# Patient Record
Sex: FEMALE | Race: WHITE | NOT HISPANIC OR LATINO | Employment: FULL TIME | URBAN - METROPOLITAN AREA
[De-identification: names, ages, dates, MRNs, and addresses within clinical notes are randomized per-mention and may not be internally consistent; named-entity substitution may affect disease eponyms.]

---

## 2017-03-21 ENCOUNTER — OFFICE VISIT (OUTPATIENT)
Dept: ENDOCRINOLOGY | Age: 27
End: 2017-03-21

## 2017-03-21 VITALS
DIASTOLIC BLOOD PRESSURE: 70 MMHG | OXYGEN SATURATION: 96 % | HEART RATE: 117 BPM | BODY MASS INDEX: 25.58 KG/M2 | HEIGHT: 62 IN | SYSTOLIC BLOOD PRESSURE: 140 MMHG | WEIGHT: 139 LBS

## 2017-03-21 DIAGNOSIS — E55.9 VITAMIN D DEFICIENCY: ICD-10-CM

## 2017-03-21 DIAGNOSIS — IMO0002 DIABETES MELLITUS TYPE 1, UNCONTROLLED, WITH COMPLICATIONS: Primary | ICD-10-CM

## 2017-03-21 DIAGNOSIS — E55.9 VITAMIN D DEFICIENCY: Primary | ICD-10-CM

## 2017-03-21 PROCEDURE — 99214 OFFICE O/P EST MOD 30 MIN: CPT | Performed by: INTERNAL MEDICINE

## 2017-03-21 NOTE — PROGRESS NOTES
26 y.o.    Patient Care Team:  Patricia Milner MD as PCP - General    Chief Complaint:  Type 1 dm    Subjective     HPI Kati Garza,26 y.o. WF is here as a follow up of Type 1 dm. Used to see Dr. Roper. Last saw him in .     Type 1 dm - Diagnosed when pt was 16 months old. Insulin was started right away. Currently on animas insulin pump. Pt used to have sensor but is not using now as the battery . Pt used sensor last in Sep 2016.  Checks BG once a week. Doesn't have FBG. Random  - 350 mg/dl. Didn't get her log book for me to review.   No increased urination or increased thirst. No BG less than 60 mg/dl in the last one month, does have hypoglycemic awareness. Highest BG in the last 1 month was -  450 mg/dl.  No nausea and vomiting.   Up to date with eye exam, Sep 2016 and no dm retinopathy, no laser therapy.   No tingling or numbness in her b/l feet, no ulcers and amputations of her feet.   No CAD, CKD,CVA per pt.   Pt is physically active. Weight has been stable.   Pt tries to follow DM diet for most part.  Last DKA episode was more than 5 years.     [place] pump -   Basal settings - 22.9   12 am - 0.9  3 am - 0.6  7 am - 0.9  8:30 pm - 1.1  9 pm - 0.9     Bolus - 12 am - 1 unit - 15 gm  4 am - 1 unit - 8 gm of carbs    SF - 1 - 40   BG target - 100 +/- 20    No recent Hba1c    Interval History      The following portions of the patient's history were reviewed and updated as appropriate: allergies, current medications, past family history, past medical history, past social history, past surgical history and problem list.    No past medical history on file.  No family history on file.  Social History     Social History   • Marital status: Single     Spouse name: N/A   • Number of children: N/A   • Years of education: N/A     Occupational History   • Not on file.     Social History Main Topics   • Smoking status: Not on file   • Smokeless tobacco: Not on file   • Alcohol use Not on file   •  "Drug use: Not on file   • Sexual activity: Not on file     Other Topics Concern   • Not on file     Social History Narrative     Allergies   Allergen Reactions   • No Known Drug Allergy        Current Outpatient Prescriptions:   •  insulin lispro (HUMALOG) 100 UNIT/ML injection, Inject  under the skin. Total of 50 units in a day, Disp: , Rfl:         Review of Systems   Constitutional: Negative for appetite change, chills, diaphoresis and fatigue.   HENT: Negative for hearing loss, nosebleeds, postnasal drip, trouble swallowing and voice change.    Eyes: Negative for photophobia, discharge and visual disturbance.   Respiratory: Negative for cough, shortness of breath and wheezing.    Cardiovascular: Negative for chest pain, palpitations and leg swelling.   Gastrointestinal: Negative for abdominal distention, abdominal pain, constipation, diarrhea, nausea and vomiting.   Endocrine: Negative for cold intolerance, heat intolerance, polydipsia and polyuria.   Genitourinary: Negative for dysuria, frequency and hematuria.   Musculoskeletal: Negative for arthralgias, back pain and myalgias.   Skin: Negative for pallor, rash and wound.   Neurological: Negative for dizziness, tremors, seizures, syncope, weakness, numbness and headaches.   Hematological: Negative for adenopathy.   Psychiatric/Behavioral: Negative for agitation, confusion and sleep disturbance. The patient is not nervous/anxious.        Objective       Vitals:    03/21/17 0856   BP: 140/70   Pulse: 117   SpO2: 96%   Weight: 139 lb (63 kg)   Height: 62\" (157.5 cm)     Body mass index is 25.42 kg/(m^2).      Physical Exam   Constitutional: She is oriented to person, place, and time. She appears well-developed and well-nourished. No distress.   HENT:   Head: Normocephalic and atraumatic.   Mouth/Throat: Oropharynx is clear and moist.   Eyes: Conjunctivae and EOM are normal. Pupils are equal, round, and reactive to light. No scleral icterus.   Neck: Normal range " of motion. Neck supple. No tracheal deviation present. No thyromegaly present.   Cardiovascular: Normal rate, regular rhythm and normal heart sounds.  Exam reveals no friction rub.    No murmur heard.  Pulmonary/Chest: Effort normal and breath sounds normal. No stridor. She has no wheezes. She has no rales.   Abdominal: Soft. Bowel sounds are normal. She exhibits no distension. There is no tenderness.   Musculoskeletal: Normal range of motion. She exhibits no edema or deformity.    Kati had a diabetic foot exam performed today.   During the foot exam she had a monofilament test performed.    Neurological Sensory Findings -  Unaltered sharp/dull right ankle/foot discrimination and unaltered sharp/dull left ankle/foot discrimination. No right ankle/foot altered proprioception and no left ankle/foot altered proprioception    Vascular Status -  Her exam exhibits no right foot edema. Her exam exhibits no left foot edema.   Skin Integrity  -  Her right foot skin is not intact.   Kati's left foot skin is not intact. .   Foot Structure and Biomechanics -  Her right foot has no hammer toes present. Her left foot has no hammer toes.      Lymphadenopathy:     She has no cervical adenopathy.   Neurological: She is alert and oriented to person, place, and time. She has normal reflexes. She displays normal reflexes.   Skin: Skin is warm and dry. She is not diaphoretic.   Psychiatric: She has a normal mood and affect. Judgment normal.   Vitals reviewed.    Results Review:     I reviewed the patient's new clinical results.    Medical records reviewed  Summary:Done      Conversion Encounter on 04/14/2015   Component Date Value Ref Range Status   • TSH 04/14/2015 1.78  0.27 - 4.20 uIU/mL Final   • Free T4 04/14/2015 1.18  0.93 - 1.70 ng/dL Final   • Glucose 04/14/2015 300* 65 - 99 mg/dL Final   • BUN 04/14/2015 9  6 - 20 mg/dL Final   • Creatinine 04/14/2015 0.73  0.57 - 1.00 mg/dL Final   • eGFR Non  Am 04/14/2015 >60   mL/min/1.732 Final    Comment: GFR Normal                            >60  Chronic Kidney Disease          <60  Kidney Failure                         <15     • eGFR  Am 04/14/2015 >60  mL/min/1.732 Final    Comment: GFR Normal                            >60  Chronic Kidney Disease          <60  Kidney Failure                         <15     • BUN/Creatinine Ratio 04/14/2015 12   Final   • Sodium 04/14/2015 135* 136 - 145 mmol/L Final   • Potassium 04/14/2015 3.8  3.5 - 5.2 mmol/L Final   • Chloride 04/14/2015 97* 98 - 107 mmol/L Final   • Total CO2 04/14/2015 22  22 - 29 mmol/L Final   • Calcium 04/14/2015 9.8  8.6 - 10.5 mg/dL Final   • Creatinine, Urine 04/14/2015 39.9  16.0 - 327.0 mg/dL Final   • Microalbumin, Urine 04/14/2015 3.0  0.0 - 17.0 ug/mL Final   • Microalbumin/Creatinine Ratio Urine 04/14/2015 7.5  0.0 - 30.0 mg/g creat Final     No results found for: HGBA1C  Lab Results   Component Value Date    MICROALBUR 3.0 04/14/2015    CREATININE 0.73 04/14/2015     Imaging Results (most recent)     None                Assessment and Plan:    Diagnoses and all orders for this visit:    Diabetes mellitus type 1, uncontrolled, with complications  -     Hemoglobin A1c  -     Comprehensive Metabolic Panel  -     Lipid Panel  -     Microalbumin / Creatinine Urine Ratio  -     TSH  -     Vitamin B12 & Folate  -     Vitamin D 25 Hydroxy    Type 1 diabetes mellitus currently on animas insulin pump  Counseled patient that she needs to check her blood sugars at least 3-4 times per day  Counseled patient that she needs to start wearing her sensor.  Will do the required paperwork for her to get started back on the sensor.  Will not make changes to the insulin pump at this time as I don't have any data to go by.    Will check lipid panel, urine microalbuminuria  Will check TSH, vitamin B12 and vitamin D 25-hydroxy, TSH.     The total time spent more than 25 min for old record and lab review and face- to- face, of  which greater than 50% of time was spent in counseling the patient on treatment options, instructions for management/treatment and follow up and importance of compliance with chosen management or treatment options

## 2017-03-22 LAB
25(OH)D3+25(OH)D2 SERPL-MCNC: 15.7 NG/ML (ref 30–100)
ALBUMIN SERPL-MCNC: 4.5 G/DL (ref 3.5–5.2)
ALBUMIN/CREAT UR: 13.9 MG/G CREAT (ref 0–30)
ALBUMIN/GLOB SERPL: 1.7 G/DL
ALP SERPL-CCNC: 65 U/L (ref 39–117)
ALT SERPL-CCNC: 10 U/L (ref 1–33)
AST SERPL-CCNC: 8 U/L (ref 1–32)
BILIRUB SERPL-MCNC: 0.2 MG/DL (ref 0.1–1.2)
BUN SERPL-MCNC: 9 MG/DL (ref 6–20)
BUN/CREAT SERPL: 11.4 (ref 7–25)
CALCIUM SERPL-MCNC: 10.2 MG/DL (ref 8.6–10.5)
CHLORIDE SERPL-SCNC: 103 MMOL/L (ref 98–107)
CHOLEST SERPL-MCNC: 181 MG/DL (ref 0–200)
CO2 SERPL-SCNC: 26 MMOL/L (ref 22–29)
CREAT SERPL-MCNC: 0.79 MG/DL (ref 0.57–1)
CREAT UR-MCNC: 256.1 MG/DL
FOLATE SERPL-MCNC: 12.93 NG/ML (ref 4.78–24.2)
GLOBULIN SER CALC-MCNC: 2.7 GM/DL
GLUCOSE SERPL-MCNC: 125 MG/DL (ref 65–99)
HBA1C MFR BLD: 10.7 % (ref 4.8–5.6)
HDLC SERPL-MCNC: 53 MG/DL (ref 40–60)
LDLC SERPL CALC-MCNC: 116 MG/DL (ref 0–100)
MICROALBUMIN UR-MCNC: 35.5 UG/ML
POTASSIUM SERPL-SCNC: 4 MMOL/L (ref 3.5–5.2)
PROT SERPL-MCNC: 7.2 G/DL (ref 6–8.5)
SODIUM SERPL-SCNC: 142 MMOL/L (ref 136–145)
TRIGL SERPL-MCNC: 59 MG/DL (ref 0–150)
TSH SERPL DL<=0.005 MIU/L-ACNC: 1.75 MIU/ML (ref 0.27–4.2)
VIT B12 SERPL-MCNC: 632 PG/ML (ref 211–946)
VLDLC SERPL CALC-MCNC: 11.8 MG/DL (ref 5–40)

## 2017-03-22 RX ORDER — ERGOCALCIFEROL 1.25 MG/1
50000 CAPSULE ORAL 2 TIMES WEEKLY
Qty: 30 CAPSULE | Refills: 11 | Status: SHIPPED | OUTPATIENT
Start: 2017-03-23 | End: 2019-02-21 | Stop reason: SDUPTHER

## 2017-03-22 NOTE — PROGRESS NOTES
Mail results to pt, no treatment changes at this time. Hba1c as expected is high, will adjust pump settings during next visit.   Vitamin D is low, calling in Vitamin D replacement.

## 2017-05-12 PROBLEM — IMO0001 IDDM (INSULIN DEPENDENT DIABETES MELLITUS): Status: ACTIVE | Noted: 2017-05-12

## 2017-05-12 PROBLEM — IMO0002 DIABETES MELLITUS TYPE 1, UNCONTROLLED: Status: ACTIVE | Noted: 2017-05-12

## 2017-05-12 RX ORDER — LANCETS
EACH MISCELLANEOUS
COMMUNITY
Start: 2013-03-04 | End: 2022-09-09 | Stop reason: SDUPTHER

## 2017-05-12 RX ORDER — INSULIN GLARGINE 100 [IU]/ML
INJECTION, SOLUTION SUBCUTANEOUS
COMMUNITY
Start: 2015-06-08 | End: 2019-02-19

## 2019-02-19 ENCOUNTER — OFFICE VISIT (OUTPATIENT)
Dept: ENDOCRINOLOGY | Age: 29
End: 2019-02-19

## 2019-02-19 VITALS
WEIGHT: 146.6 LBS | BODY MASS INDEX: 26.98 KG/M2 | SYSTOLIC BLOOD PRESSURE: 96 MMHG | DIASTOLIC BLOOD PRESSURE: 60 MMHG | HEIGHT: 62 IN | HEART RATE: 112 BPM

## 2019-02-19 DIAGNOSIS — E10.65 UNCONTROLLED TYPE 1 DIABETES MELLITUS WITH HYPERGLYCEMIA (HCC): Primary | ICD-10-CM

## 2019-02-19 PROCEDURE — 99214 OFFICE O/P EST MOD 30 MIN: CPT | Performed by: INTERNAL MEDICINE

## 2019-02-19 NOTE — PROGRESS NOTES
28 y.o.    Patient Care Team:  Patricia Milner MD as PCP - General    Chief Complaint:      F/U TYPE 1 DIABETES, UNCONTROLLED.  NO RECENT LABS.  Subjective     HPI    Kati Garza,28 y.o. WF is here as a follow up of Type 1 dm.   Last seen by me in March 2017.  Patient has been extremely noncompliant in terms of keeping up with her appointments and following up.    Type 1 diabetes mellitus-diagnosed when she was 16 months old.  Currently she is on animus insulin pump.  She has not been using the sensor since September 2016.  Has been checking her blood sugars 2-3 times a day.  Most of her blood sugars are running between 100-300s range.  No complaints of increased urination or thirst.  No drastic low blood sugar episodes, lowest blood sugar was 94.  Does have hypoglycemic awareness.  Highest blood sugar was 450, this was when she had Whitney for [employer].  No nausea or vomiting.  Last eye examination was in December 2017, no known history of diabetic retinopathy according to the patient.  No known history of diabetic neuropathy per patient.  No ulcers or open wounds on her body.  No CAD, CKD,CVA per pt.   Pt is physically active. Weight has been stable.   Pt tries to follow DM diet for most part.  Last DKA episode was more than 5 years.      [place] pump -   Basal settings - 22.9   12 am - 0.9  3 am - 0.6  7 am - 0.9  8:30 pm - 1.1  9 pm - 0.9      Bolus - 12 am - 1 unit - 15 gm  4 am - 1 unit - 8 gm of carbs     SF -12 AM- 1 - 40     4M- 1 for 50   BG target - 100 +/- 20     No recent Hba1c      Reviewed primary care physician's/consulting physician documentation and lab results :     Interval History      The following portions of the patient's history were reviewed and updated as appropriate: allergies, current medications, past family history, past medical history, past social history, past surgical history and problem list.    History reviewed. No pertinent past medical history.  History reviewed.  "No pertinent family history.  Social History     Socioeconomic History   • Marital status: Single     Spouse name: Not on file   • Number of children: Not on file   • Years of education: Not on file   • Highest education level: Not on file   Social Needs   • Financial resource strain: Not on file   • Food insecurity - worry: Not on file   • Food insecurity - inability: Not on file   • Transportation needs - medical: Not on file   • Transportation needs - non-medical: Not on file   Occupational History   • Not on file   Tobacco Use   • Smoking status: Not on file   Substance and Sexual Activity   • Alcohol use: Not on file   • Drug use: Not on file   • Sexual activity: Not on file   Other Topics Concern   • Not on file   Social History Narrative   • Not on file     Allergies   Allergen Reactions   • No Known Drug Allergy        Current Outpatient Medications:   •  glucose blood (ONE TOUCH ULTRA TEST) test strip, OneTouch Ultra Blue STRP; Patient Sig: OneTouch Ultra Blue STRP TEST 4  TIMES A DAY; 2; 0; 04-Mar-2013; Active, Disp: , Rfl:   •  insulin lispro (HUMALOG) 100 UNIT/ML injection, Inject  under the skin. Total of 50 units in a day, Disp: , Rfl:   •  Lancets (ONETOUCH ULTRASOFT) lancets, , Disp: , Rfl:   •  vitamin D (ERGOCALCIFEROL) 85984 UNITS capsule capsule, Take 1 capsule by mouth 2 (Two) Times a Week., Disp: 30 capsule, Rfl: 11        Review of Systems   Constitutional: Negative for chills, fatigue and fever.   Cardiovascular: Negative for chest pain and palpitations.   Gastrointestinal: Negative for abdominal pain, constipation, diarrhea, nausea and vomiting.   Endocrine: Negative for cold intolerance and heat intolerance.       Objective       Vitals:    02/19/19 1414   BP: 96/60   Pulse: 112   Weight: 66.5 kg (146 lb 9.6 oz)   Height: 157.5 cm (62\")     Body mass index is 26.81 kg/m².      Physical Exam   Constitutional: She is oriented to person, place, and time. She appears well-nourished.   HENT: "   Head: Normocephalic and atraumatic.   Eyes: Conjunctivae and EOM are normal. No scleral icterus.   Neck: Normal range of motion. Neck supple. No thyromegaly present.   Cardiovascular: Normal rate and normal heart sounds. Exam reveals no friction rub.   No murmur heard.  Pulmonary/Chest: Effort normal and breath sounds normal. No stridor. She has no wheezes. She has no rales.   Abdominal: Soft. Bowel sounds are normal. She exhibits no distension. There is no tenderness.   Pump site noted   Musculoskeletal: She exhibits no edema or tenderness.   Lymphadenopathy:     She has no cervical adenopathy.   Neurological: She is alert and oriented to person, place, and time.   Skin: Skin is warm and dry. She is not diaphoretic.   Psychiatric: She has a normal mood and affect.   Vitals reviewed.    Results Review:     I reviewed the patient's new clinical results and mentioned them above in HPI and in plan as well.    Medical records reviewed  Summary:done      Office Visit on 03/21/2017   Component Date Value Ref Range Status   • Hemoglobin A1C 03/21/2017 10.70* 4.80 - 5.60 % Final    Comment: Hemoglobin A1C Ranges:  Increased Risk for Diabetes  5.7% to 6.4%  Diabetes                     >= 6.5%  Diabetic Goal                < 7.0%     • Glucose 03/21/2017 125* 65 - 99 mg/dL Final   • BUN 03/21/2017 9  6 - 20 mg/dL Final   • Creatinine 03/21/2017 0.79  0.57 - 1.00 mg/dL Final   • eGFR Non  Am 03/21/2017 87  >60 mL/min/1.73 Final   • eGFR African Am 03/21/2017 105  >60 mL/min/1.73 Final   • BUN/Creatinine Ratio 03/21/2017 11.4  7.0 - 25.0 Final   • Sodium 03/21/2017 142  136 - 145 mmol/L Final   • Potassium 03/21/2017 4.0  3.5 - 5.2 mmol/L Final   • Chloride 03/21/2017 103  98 - 107 mmol/L Final   • Total CO2 03/21/2017 26.0  22.0 - 29.0 mmol/L Final   • Calcium 03/21/2017 10.2  8.6 - 10.5 mg/dL Final   • Total Protein 03/21/2017 7.2  6.0 - 8.5 g/dL Final   • Albumin 03/21/2017 4.50  3.50 - 5.20 g/dL Final   • Globulin  03/21/2017 2.7  gm/dL Final   • A/G Ratio 03/21/2017 1.7  g/dL Final   • Total Bilirubin 03/21/2017 0.2  0.1 - 1.2 mg/dL Final   • Alkaline Phosphatase 03/21/2017 65  39 - 117 U/L Final   • AST (SGOT) 03/21/2017 8  1 - 32 U/L Final   • ALT (SGPT) 03/21/2017 10  1 - 33 U/L Final   • Total Cholesterol 03/21/2017 181  0 - 200 mg/dL Final   • Triglycerides 03/21/2017 59  0 - 150 mg/dL Final   • HDL Cholesterol 03/21/2017 53  40 - 60 mg/dL Final   • VLDL Cholesterol 03/21/2017 11.8  5 - 40 mg/dL Final   • LDL Cholesterol  03/21/2017 116* 0 - 100 mg/dL Final   • Creatinine, Urine 03/21/2017 256.1  Not Estab. mg/dL Final   • Microalbumin, Urine 03/21/2017 35.5  Not Estab. ug/mL Final   • Microalbumin/Creatinine Ratio Urine 03/21/2017 13.9  0.0 - 30.0 mg/g creat Final   • TSH 03/21/2017 1.750  0.270 - 4.200 mIU/mL Final   • Vitamin B-12 03/21/2017 632  211 - 946 pg/mL Final   • Folate 03/21/2017 12.93  4.78 - 24.20 ng/mL Final    Comment: Please Note: The Folate Reference Range has changed based  on the WHO Standard. Previous Reference Range was  (7.3-26.1)     • 25 Hydroxy, Vitamin D 03/21/2017 15.7* 30.0 - 100.0 ng/mL Final    Comment: Vitamin D deficiency has been defined by the [place] of  Medicine and an Endocrine Society practice guideline as a  level of serum 25-OH vitamin D less than 20 ng/mL (1,2).  The Endocrine Society went on to further define vitamin D  insufficiency as a level between 21 and 29 ng/mL (2).  1. IOM ([place] of [employer]). 2010. Dietary reference     intakes for calcium and D. [place]: The     National Academies Press.  2. Anju MF, Arslan NC, Samantha CAPONE, et al.     Evaluation, treatment, and prevention of vitamin D     deficiency: an Endocrine Society clinical practice     guideline. JCEM. 2011 Jul; 96(7):1911-30.       Lab Results   Component Value Date    HGBA1C 10.70 (H) 03/21/2017     Lab Results   Component Value Date    MICROALBUR 35.5 03/21/2017    CREATININE 0.79  "03/21/2017     Imaging Results (most recent)     None                Assessment and Plan:    Diagnoses and all orders for this visit:    Uncontrolled type 1 diabetes mellitus with hyperglycemia (CMS/[facility])  -     Basic Metabolic Panel  -     Hemoglobin A1c  -     TSH  -     Vitamin B12 & Folate  -     Vitamin D 25 Hydroxy  -     Microalbumin / Creatinine Urine Ratio - Urine, Clean Catch  -     Hemoglobin A1c; Future  -     Lipid Panel; Future      Type 1 diabetes mellitus-uncontrolled, complicated with noncompliance  Will get the patient attached to [employer] agent to discuss more about the benefits of 670 G.  We will place continuous glucose monitoring on the patient to further assess her blood glucose trends and make adjustments to her insulin pump.  Explained to the patient clearly that she needs to show compliance before she goes on the Medtronic insulin pump if not the paperwork cannot be refilled or renewed.    We will check HbA1c and based on the CGM information will do the pump titrations.    We will check thyroid panel, vitamin D levels.    Will check lipid panel during her next visit.    Reviewed Lab results with the patient.     Placed CGM on the pt for BG monitoring.   Explained the benefits of CGM to the pt.   CGM would help in adjusting pt's insulin regimen and prevent hyperglycemia and hypoglycemia episodes.   Pt would wear CGM for the next 2 weeks and based on the data downloaded we will adjust his DM medications.         David Zaragoza MD  02/19/19    EMR Dragon / transcription disclaimer:     \"Dictated utilizing Dragon dictation\".  "

## 2019-02-20 LAB
25(OH)D3+25(OH)D2 SERPL-MCNC: 12.3 NG/ML (ref 30–100)
ALBUMIN/CREAT UR: 25.9 MG/G CREAT (ref 0–30)
BUN SERPL-MCNC: 7 MG/DL (ref 6–20)
BUN/CREAT SERPL: 11 (ref 9–23)
CALCIUM SERPL-MCNC: 9.1 MG/DL (ref 8.7–10.2)
CHLORIDE SERPL-SCNC: 105 MMOL/L (ref 96–106)
CO2 SERPL-SCNC: 21 MMOL/L (ref 20–29)
CREAT SERPL-MCNC: 0.66 MG/DL (ref 0.57–1)
CREAT UR-MCNC: 41.7 MG/DL
FOLATE SERPL-MCNC: 7.3 NG/ML
GLUCOSE SERPL-MCNC: 89 MG/DL (ref 65–99)
HBA1C MFR BLD: 11.7 % (ref 4.8–5.6)
MICROALBUMIN UR-MCNC: 10.8 UG/ML
POTASSIUM SERPL-SCNC: 4 MMOL/L (ref 3.5–5.2)
SODIUM SERPL-SCNC: 140 MMOL/L (ref 134–144)
TSH SERPL DL<=0.005 MIU/L-ACNC: 1.61 UIU/ML (ref 0.45–4.5)
VIT B12 SERPL-MCNC: 584 PG/ML (ref 232–1245)

## 2019-02-21 RX ORDER — ERGOCALCIFEROL 1.25 MG/1
50000 CAPSULE ORAL 2 TIMES WEEKLY
Qty: 30 CAPSULE | Refills: 11 | Status: SHIPPED | OUTPATIENT
Start: 2019-02-21 | End: 2020-05-19

## 2019-02-21 NOTE — PROGRESS NOTES
Mail results to pt, no treatment changes at this time.  Pending continuous glucose monitoring information to make further changes to the insulin pump.  HbA1c is worse than last visit.  Vitamin D levels are very low, will start patient on vitamin D prescription.

## 2019-03-20 ENCOUNTER — TREATMENT (OUTPATIENT)
Dept: ENDOCRINOLOGY | Age: 29
End: 2019-03-20

## 2019-03-20 DIAGNOSIS — E10.65 UNCONTROLLED TYPE 1 DIABETES MELLITUS WITH HYPERGLYCEMIA (HCC): ICD-10-CM

## 2019-03-20 PROCEDURE — 95251 CONT GLUC MNTR ANALYSIS I&R: CPT | Performed by: INTERNAL MEDICINE

## 2019-03-21 NOTE — PROGRESS NOTES
Continues glucose monitoring data     Continuous glucose monitoring was placed on  Feb 19th      Patient wore continuous glucose monitoring-free style sarai Pro from feb 19- March 5th   Average blood glucose reading was 236   Estimated HbA1c 9-10   Likelihood of low blood glucose levels was low   Likelihood of high blood glucose levels was high   Blood glucose trends showed consistent high bg.      Current treatment regimen     Pump settings   12 am - 0.9   3 am - 0.6  7 am - 0.9  8:30 pm 1.1   9  pm 0.9     Bolus   12 am - 1 unit for 15 Gm   4 am - 1 unit for 8 Gm      Sensitivity 1 for 40      Changes to the treatment regimen     12 am - 1.0   3 am - 1.25  7 am - 1.5  8:30 pm 1.0  9  pm 0.9     Bolus   12 am - 1 unit for 10 gm   4 am - 1 unit for 6 gm     Please call pt and make these changes.   If pt not comfortable for these changes make an appointment with me in 4-6 weeks based on availability if not plz place on waiting list.

## 2019-04-23 ENCOUNTER — LAB (OUTPATIENT)
Dept: ENDOCRINOLOGY | Age: 29
End: 2019-04-23

## 2019-04-23 DIAGNOSIS — E10.65 UNCONTROLLED TYPE 1 DIABETES MELLITUS WITH HYPERGLYCEMIA (HCC): ICD-10-CM

## 2019-04-23 LAB
CHOLEST SERPL-MCNC: 165 MG/DL (ref 0–200)
HBA1C MFR BLD: 10.6 % (ref 4.8–5.6)
HDLC SERPL-MCNC: 61 MG/DL (ref 40–60)
INTERPRETATION: NORMAL
LDLC SERPL CALC-MCNC: 95 MG/DL (ref 0–100)
Lab: NORMAL
TRIGL SERPL-MCNC: 43 MG/DL (ref 0–150)
VLDLC SERPL CALC-MCNC: 8.6 MG/DL

## 2019-05-07 ENCOUNTER — OFFICE VISIT (OUTPATIENT)
Dept: ENDOCRINOLOGY | Age: 29
End: 2019-05-07

## 2019-05-07 VITALS
WEIGHT: 145 LBS | OXYGEN SATURATION: 99 % | HEART RATE: 103 BPM | DIASTOLIC BLOOD PRESSURE: 80 MMHG | BODY MASS INDEX: 26.68 KG/M2 | HEIGHT: 62 IN | SYSTOLIC BLOOD PRESSURE: 130 MMHG

## 2019-05-07 DIAGNOSIS — Z46.81 INSULIN PUMP TITRATION: ICD-10-CM

## 2019-05-07 DIAGNOSIS — E10.65 UNCONTROLLED TYPE 1 DIABETES MELLITUS WITH HYPERGLYCEMIA (HCC): Primary | ICD-10-CM

## 2019-05-07 PROCEDURE — 99214 OFFICE O/P EST MOD 30 MIN: CPT | Performed by: INTERNAL MEDICINE

## 2019-05-07 NOTE — PROGRESS NOTES
29 y.o.    Patient Care Team:  Patricia Milner MD as PCP - General    Chief Complaint:    Follow up/ type 2 diabetes mellitus  Subjective     HPI    Kati Garza,29 y.o. WF is here as a follow up of Type 1 dm.   Since patient's last visit patient has exchanged her animus insulin pump to Medtronic insulin pump.         Type 1 diabetes mellitus-diagnosed when she was 16 months old.    She is currently on the Medtronic insulin pump-670 G, she still has the sensor being processed.  She is currently using the manual mode and the  bolus settings.  Still checking her blood sugars 1-2 times a day.  Fasting blood [id number]-, pre-meal blood sugars-unknown as patient is not checking at that time.  She does think that she is not having significant loose less than 60, lowest was 84.  Does have hypoglycemic awareness.  Highest blood sugar would have been approximately 300 but she is not sure of it as she is not checking her blood sugars that often.  No increased urination or thirst  No nausea or vomiting.  Last eye examination was in December 2017, no known history of diabetic retinopathy according to the patient.  No known history of diabetic neuropathy per patient.  No ulcers or open wounds on her body.  No CAD, CKD,CVA per pt.   Pt is physically active. Weight has been stable.   Pt tries to follow DM diet for most part.  She reports being comfortable and carbohydrate count  Last DKA episode was more than 5 years.     Reviewed primary care physician's/consulting physician documentation and lab results :     Interval History      The following portions of the patient's history were reviewed and updated as appropriate: allergies, current medications, past family history, past medical history, past social history, past surgical history and problem list.    History reviewed. No pertinent past medical history.  History reviewed. No pertinent family history.  Social History     Socioeconomic History   • Marital status:  "Single     Spouse name: Not on file   • Number of children: Not on file   • Years of education: Not on file   • Highest education level: Not on file     Allergies   Allergen Reactions   • No Known Drug Allergy        Current Outpatient Medications:   •  glucose blood (ONE TOUCH ULTRA TEST) test strip, OneTouch Ultra Blue STRP; Patient Sig: OneTouch Ultra Blue STRP TEST 4  TIMES A DAY; 2; 0; 04-Mar-2013; Active, Disp: , Rfl:   •  insulin lispro (HUMALOG) 100 UNIT/ML injection, Total of 50 units in a day, Disp: 50 mL, Rfl: 3  •  Lancets (ONETOUCH ULTRASOFT) lancets, , Disp: , Rfl:   •  vitamin D (ERGOCALCIFEROL) 16192 units capsule capsule, Take 1 capsule by mouth 2 (Two) Times a Week., Disp: 30 capsule, Rfl: 11        Review of Systems   Constitutional: Negative for appetite change, fatigue and fever.   Eyes: Negative for visual disturbance.   Respiratory: Negative for shortness of breath.    Cardiovascular: Negative for palpitations and leg swelling.   Gastrointestinal: Negative for abdominal pain and vomiting.   Endocrine: Negative for polydipsia and polyuria.   Musculoskeletal: Negative for joint swelling and neck pain.   Skin: Negative for rash.   Neurological: Negative for weakness and numbness.   Psychiatric/Behavioral: Negative for behavioral problems.       Objective       Vitals:    05/07/19 1149   BP: 130/80   Pulse: 103   SpO2: 99%   Weight: 65.8 kg (145 lb)   Height: 157.5 cm (62\")     Body mass index is 26.52 kg/m².      Physical Exam   Constitutional: She is oriented to person, place, and time. She appears well-nourished.   HENT:   Head: Normocephalic and atraumatic.   Eyes: Conjunctivae and EOM are normal. No scleral icterus.   Neck: Normal range of motion. Neck supple. No thyromegaly present.   Cardiovascular: Normal rate and normal heart sounds. Exam reveals no friction rub.   No murmur heard.  Pulmonary/Chest: Effort normal and breath sounds normal. No stridor. She has no wheezes. She has no rales. "   Abdominal: Soft. Bowel sounds are normal. She exhibits no distension. There is no tenderness.   Pump site intact   Musculoskeletal: She exhibits no edema or tenderness.   Lymphadenopathy:     She has no cervical adenopathy.   Neurological: She is alert and oriented to person, place, and time.   Skin: Skin is warm and dry. She is not diaphoretic.   Psychiatric: She has a normal mood and affect.   Vitals reviewed.    Results Review:     I reviewed the patient's new clinical results and mentioned them above in HPI and in plan as well.    Medical records reviewed  Summary: done      Lab on 04/23/2019   Component Date Value Ref Range Status   • Total Cholesterol 04/23/2019 165  0 - 200 mg/dL Final   • Triglycerides 04/23/2019 43  0 - 150 mg/dL Final   • HDL Cholesterol 04/23/2019 61* 40 - 60 mg/dL Final   • VLDL Cholesterol 04/23/2019 8.6  mg/dL Final   • LDL Cholesterol  04/23/2019 95  0 - 100 mg/dL Final   • Hemoglobin A1C 04/23/2019 10.60* 4.80 - 5.60 % Final    Comment: Hemoglobin A1C Ranges:  Increased Risk for Diabetes  5.7% to 6.4%  Diabetes                     >= 6.5%  Diabetic Goal                < 7.0%     • Interpretation 04/23/2019 Note   Final    Supplemental report is available.   • PDF Image 04/23/2019 Not applicable   Final     Lab Results   Component Value Date    HGBA1C 10.60 (H) 04/23/2019    HGBA1C 11.7 (H) 02/19/2019    HGBA1C 10.70 (H) 03/21/2017     Lab Results   Component Value Date    MICROALBUR 10.8 02/19/2019    CREATININE 0.66 02/19/2019     Imaging Results (most recent)     None                Assessment and Plan:    Diagnoses and all orders for this visit:    Uncontrolled type 1 diabetes mellitus with hyperglycemia (CMS/[facility])  -     Hemoglobin A1c; Future  -     Basic Metabolic Panel; Future  -     Lipid Panel; Future  -     Microalbumin / Creatinine Urine Ratio - Urine, Clean Catch; Future  -     TSH; Future  -     Vitamin B12 & Folate; Future  -     Vitamin D 25 Hydroxy; Future  -     T4,  "Free; Future    Insulin pump titration  -     Hemoglobin A1c; Future  -     Basic Metabolic Panel; Future  -     Lipid Panel; Future  -     Microalbumin / Creatinine Urine Ratio - Urine, Clean Catch; Future  -     TSH; Future  -     Vitamin B12 & Folate; Future  -     Vitamin D 25 Hydroxy; Future  -     T4, Free; Future    Other orders  -     insulin lispro (HUMALOG) 100 UNIT/ML injection; Total of 50 units in a day      Type 1 diabetes mellitus-uncontrolled  HbA1c is still very high  Discussed with the patient that we need to get the sensor approved to get started on the auto mode.  Change to basal and bolus settings.  Change the active insulin time.  Discussed with the Medtronic agent to review the process of the sensor approval so that the patient could be started on it as soon as possible.    Vitamin D deficiency  We will renew the vitamin D replacement.    Hyperlipidemia  Discussed with the patient about low-fat diet and exercise that might help her cholesterol panel    Reviewed Lab results with the patient.             David Zaragoza MD  05/07/19    EMR Dragon / transcription disclaimer:     \"Dictated utilizing Dragon dictation\".  "

## 2019-07-26 ENCOUNTER — LAB (OUTPATIENT)
Dept: ENDOCRINOLOGY | Age: 29
End: 2019-07-26

## 2019-07-26 DIAGNOSIS — E10.65 UNCONTROLLED TYPE 1 DIABETES MELLITUS WITH HYPERGLYCEMIA (HCC): ICD-10-CM

## 2019-07-26 DIAGNOSIS — Z46.81 INSULIN PUMP TITRATION: ICD-10-CM

## 2019-07-27 LAB
25(OH)D3+25(OH)D2 SERPL-MCNC: 17.4 NG/ML (ref 30–100)
ALBUMIN/CREAT UR: 6 MG/G CREAT (ref 0–30)
BUN SERPL-MCNC: 9 MG/DL (ref 6–20)
BUN/CREAT SERPL: 9.7 (ref 7–25)
CALCIUM SERPL-MCNC: 9 MG/DL (ref 8.6–10.5)
CHLORIDE SERPL-SCNC: 104 MMOL/L (ref 98–107)
CHOLEST SERPL-MCNC: 160 MG/DL (ref 0–200)
CO2 SERPL-SCNC: 27.2 MMOL/L (ref 22–29)
CREAT SERPL-MCNC: 0.93 MG/DL (ref 0.57–1)
CREAT UR-MCNC: 153.9 MG/DL
FOLATE SERPL-MCNC: 4.28 NG/ML (ref 4.78–24.2)
GLUCOSE SERPL-MCNC: 180 MG/DL (ref 65–99)
HBA1C MFR BLD: 8 % (ref 4.8–5.6)
HDLC SERPL-MCNC: 45 MG/DL (ref 40–60)
INTERPRETATION: NORMAL
LDLC SERPL CALC-MCNC: 93 MG/DL (ref 0–100)
Lab: NORMAL
MICROALBUMIN UR-MCNC: 9.3 UG/ML
POTASSIUM SERPL-SCNC: 4.3 MMOL/L (ref 3.5–5.2)
SODIUM SERPL-SCNC: 141 MMOL/L (ref 136–145)
T4 FREE SERPL-MCNC: 1.03 NG/DL (ref 0.93–1.7)
TRIGL SERPL-MCNC: 108 MG/DL (ref 0–150)
TSH SERPL DL<=0.005 MIU/L-ACNC: 1.27 MIU/ML (ref 0.27–4.2)
VIT B12 SERPL-MCNC: 482 PG/ML (ref 211–946)
VLDLC SERPL CALC-MCNC: 21.6 MG/DL

## 2019-08-09 ENCOUNTER — OFFICE VISIT (OUTPATIENT)
Dept: ENDOCRINOLOGY | Age: 29
End: 2019-08-09

## 2019-08-09 VITALS
OXYGEN SATURATION: 98 % | DIASTOLIC BLOOD PRESSURE: 62 MMHG | BODY MASS INDEX: 27.79 KG/M2 | HEART RATE: 103 BPM | HEIGHT: 62 IN | SYSTOLIC BLOOD PRESSURE: 110 MMHG | WEIGHT: 151 LBS

## 2019-08-09 DIAGNOSIS — Z46.81 INSULIN PUMP TITRATION: ICD-10-CM

## 2019-08-09 DIAGNOSIS — E78.2 MIXED HYPERLIPIDEMIA: ICD-10-CM

## 2019-08-09 DIAGNOSIS — E10.65 UNCONTROLLED TYPE 1 DIABETES MELLITUS WITH HYPERGLYCEMIA (HCC): Primary | ICD-10-CM

## 2019-08-09 PROCEDURE — 99214 OFFICE O/P EST MOD 30 MIN: CPT | Performed by: INTERNAL MEDICINE

## 2019-08-09 PROCEDURE — 95251 CONT GLUC MNTR ANALYSIS I&R: CPT | Performed by: INTERNAL MEDICINE

## 2019-08-09 RX ORDER — ATORVASTATIN CALCIUM 10 MG/1
10 TABLET, FILM COATED ORAL DAILY
Qty: 30 TABLET | Refills: 11 | Status: SHIPPED | OUTPATIENT
Start: 2019-08-09 | End: 2020-08-08

## 2019-08-09 NOTE — PROGRESS NOTES
29 y.o.    Patient Care Team:  Patricia Milner MD as PCP - General    Chief Complaint:   FOLLOW UP/ TYPE 1 DIABETES MELLITUS  Subjective     HPI     Kati Garza,29 y.o. WF is here as a follow up of Type 1 dm.   Since patient's last visit patient has exchanged her animus insulin pump to Medtronic insulin pump.        Type 1 diabetes mellitus-diagnosed when she was 16 months old.    Today in the clinic patient reports that she is on Medtronic insulin pump-670 G and she is using the sensor.  She has been calibrating the sensor 2-3 times a day.  Average blood sugars are around 170 ± 20.  She does have random episodes of low blood sugars for which the sensor is alerting her.  Does have hypoglycemic awareness.  She does have high blood sugars during the afternoon and at dinner times.  Downloaded the insulin pump and the sensor information and reviewed this data.  No increased urination or thirst  No nausea or vomiting.  Last eye examination was in December 2017, no known history of diabetic retinopathy according to the patient.  No known history of diabetic neuropathy per patient.  No ulcers or open wounds on her body.  No CAD, CKD,CVA per pt.   Pt is physically active. Weight has been stable.   Pt tries to follow DM diet for most part.  She reports being comfortable and carbohydrate count  Last DKA episode was more than 5 years.     Hyperlipidemia  Not on any medication.    Reports that her menstrual cycles are regular.        Reviewed primary care physician's/consulting physician documentation and lab results :     Interval History      The following portions of the patient's history were reviewed and updated as appropriate: allergies, current medications, past family history, past medical history, past social history, past surgical history and problem list.    History reviewed. No pertinent past medical history.  History reviewed. No pertinent family history.  Social History     Socioeconomic History   •  "Marital status: Single     Spouse name: Not on file   • Number of children: Not on file   • Years of education: Not on file   • Highest education level: Not on file   Tobacco Use   • Smoking status: Unknown If Ever Smoked     Allergies   Allergen Reactions   • No Known Drug Allergy        Current Outpatient Medications:   •  glucose blood (ONE TOUCH ULTRA TEST) test strip, OneTouch Ultra Blue STRP; Patient Sig: OneTouch Ultra Blue STRP TEST 4  TIMES A DAY; 2; 0; 04-Mar-2013; Active, Disp: , Rfl:   •  insulin lispro (HUMALOG) 100 UNIT/ML injection, Total of 50 units in a day, Disp: 50 mL, Rfl: 3  •  Lancets (ONETOUCH ULTRASOFT) lancets, , Disp: , Rfl:   •  vitamin D (ERGOCALCIFEROL) 47881 units capsule capsule, Take 1 capsule by mouth 2 (Two) Times a Week., Disp: 30 capsule, Rfl: 11  •  atorvastatin (LIPITOR) 10 MG tablet, Take 1 tablet by mouth Daily., Disp: 30 tablet, Rfl: 11        Review of Systems   Constitutional: Negative for appetite change, fatigue and fever.   Eyes: Negative for visual disturbance.   Respiratory: Negative for shortness of breath.    Cardiovascular: Negative for palpitations and leg swelling.   Gastrointestinal: Negative for abdominal pain and vomiting.   Endocrine: Negative for polydipsia and polyuria.   Musculoskeletal: Negative for joint swelling and neck pain.   Skin: Negative for rash.   Neurological: Negative for weakness and numbness.   Psychiatric/Behavioral: Negative for behavioral problems.       Objective       Vitals:    08/09/19 1252   BP: 110/62   Pulse: 103   SpO2: 98%   Weight: 68.5 kg (151 lb)   Height: 157.5 cm (62\")     Body mass index is 27.62 kg/m².      Physical Exam   Constitutional: She is oriented to person, place, and time. She appears well-nourished.   HENT:   Head: Normocephalic and atraumatic.   Eyes: Conjunctivae and EOM are normal. No scleral icterus.   Neck: Normal range of motion. Neck supple. No thyromegaly present.   Cardiovascular: Normal rate and normal " heart sounds. Exam reveals no friction rub.   No murmur heard.  Pulmonary/Chest: Effort normal and breath sounds normal. No stridor. She has no wheezes. She has no rales.   Abdominal: Soft. Bowel sounds are normal. She exhibits no distension. There is no tenderness.   Pump site intact   Musculoskeletal: She exhibits no edema or tenderness.   Lymphadenopathy:     She has no cervical adenopathy.   Neurological: She is alert and oriented to person, place, and time.   Skin: Skin is warm and dry. She is not diaphoretic.   Psychiatric: She has a normal mood and affect.   Vitals reviewed.    Results Review:     I reviewed the patient's new clinical results and mentioned them above in HPI and in plan as well.    Medical records reviewed  Summary:DONE      Lab on 07/26/2019   Component Date Value Ref Range Status   • Free T4 07/26/2019 1.03  0.93 - 1.70 ng/dL Final   • 25 Hydroxy, Vitamin D 07/26/2019 17.4* 30.0 - 100.0 ng/ml Final    Comment: Reference Range for Total Vitamin D 25(OH)  Deficiency <20.0 ng/mL  Insufficiency 21-29 ng/mL  Sufficiency  ng/mL  Toxicity >100 ng/ml     • Vitamin B-12 07/26/2019 482  211 - 946 pg/mL Final   • Folate 07/26/2019 4.28* 4.78 - 24.20 ng/mL Final   • TSH 07/26/2019 1.270  0.270 - 4.200 mIU/mL Final   • Creatinine, Urine 07/26/2019 153.9  Not Estab. mg/dL Final   • Microalbumin, Urine 07/26/2019 9.3  Not Estab. ug/mL Final   • Microalbumin/Creatinine Ratio 07/26/2019 6.0  0.0 - 30.0 mg/g creat Final    Comment:                      Normal:                0.0 -  30.0                       Albuminuria:          31.0 - 300.0                       Clinical albuminuria:       >300.0     • Total Cholesterol 07/26/2019 160  0 - 200 mg/dL Final   • Triglycerides 07/26/2019 108  0 - 150 mg/dL Final   • HDL Cholesterol 07/26/2019 45  40 - 60 mg/dL Final   • VLDL Cholesterol 07/26/2019 21.6  mg/dL Final   • LDL Cholesterol  07/26/2019 93  0 - 100 mg/dL Final   • Glucose 07/26/2019 180* 65 - 99  mg/dL Final   • BUN 07/26/2019 9  6 - 20 mg/dL Final   • Creatinine 07/26/2019 0.93  0.57 - 1.00 mg/dL Final   • eGFR Non African Am 07/26/2019 70  >60 mL/min/1.73 Final   • eGFR African Am 07/26/2019 85  >60 mL/min/1.73 Final   • BUN/Creatinine Ratio 07/26/2019 9.7  7.0 - 25.0 Final   • Sodium 07/26/2019 141  136 - 145 mmol/L Final   • Potassium 07/26/2019 4.3  3.5 - 5.2 mmol/L Final   • Chloride 07/26/2019 104  98 - 107 mmol/L Final   • Total CO2 07/26/2019 27.2  22.0 - 29.0 mmol/L Final   • Calcium 07/26/2019 9.0  8.6 - 10.5 mg/dL Final   • Hemoglobin A1C 07/26/2019 8.00* 4.80 - 5.60 % Final    Comment: Hemoglobin A1C Ranges:  Increased Risk for Diabetes  5.7% to 6.4%  Diabetes                     >= 6.5%  Diabetic Goal                < 7.0%     • Interpretation 07/26/2019 Note   Final    Supplemental report is available.   • PDF Image 07/26/2019 Not applicable   Final     Lab Results   Component Value Date    HGBA1C 8.00 (H) 07/26/2019    HGBA1C 10.60 (H) 04/23/2019    HGBA1C 11.7 (H) 02/19/2019     Lab Results   Component Value Date    MICROALBUR 9.3 07/26/2019    CREATININE 0.93 07/26/2019     Imaging Results (most recent)     None                      Assessment and Plan:    Kati was seen today for diabetes.    Diagnoses and all orders for this visit:    Uncontrolled type 1 diabetes mellitus with hyperglycemia (CMS/[facility])  -     Hemoglobin A1c; Future  -     Basic Metabolic Panel; Future  -     Lipid Panel; Future  -     Microalbumin / Creatinine Urine Ratio - Urine, Clean Catch; Future  -     TSH; Future  -     T4, Free; Future    Insulin pump titration  -     Hemoglobin A1c; Future  -     Basic Metabolic Panel; Future  -     Lipid Panel; Future  -     Microalbumin / Creatinine Urine Ratio - Urine, Clean Catch; Future  -     TSH; Future  -     T4, Free; Future    Mixed hyperlipidemia  -     Hemoglobin A1c; Future  -     Basic Metabolic Panel; Future  -     Lipid Panel; Future  -     Microalbumin /  "Creatinine Urine Ratio - Urine, Clean Catch; Future  -     TSH; Future  -     T4, Free; Future    Other orders  -     atorvastatin (LIPITOR) 10 MG tablet; Take 1 tablet by mouth Daily.      Type 1 diabetes mellitus-uncontrolled  Patient is in the auto mode, will improve the duration in the auto mode by increasing the number of times that she is calibrating.  Made the changes at lunch and dinnertime for her bolus settings  Also changed to sensitivity.    Hyperlipidemia  Start Lipitor 10 mg oral daily.    Reviewed Lab results with the patient.             David Zaragoza MD  08/09/19    EMR Dragon / transcription disclaimer:     \"Dictated utilizing Dragon dictation\".  "

## 2019-10-27 ENCOUNTER — HOSPITAL ENCOUNTER (INPATIENT)
Facility: HOSPITAL | Age: 29
LOS: 4 days | Discharge: HOME | End: 2019-10-31
Attending: EMERGENCY MEDICINE | Admitting: HOSPITALIST

## 2019-10-27 DIAGNOSIS — R00.0 TACHYCARDIA: ICD-10-CM

## 2019-10-27 DIAGNOSIS — L03.312 CELLULITIS OF BACK EXCEPT BUTTOCK: ICD-10-CM

## 2019-10-27 DIAGNOSIS — Z79.4 OTHER SPECIFIED DIABETES MELLITUS WITH OTHER SPECIFIED COMPLICATION, WITH LONG-TERM CURRENT USE OF INSULIN (HCC): Primary | ICD-10-CM

## 2019-10-27 DIAGNOSIS — L02.232 CARBUNCLE OF BACK: ICD-10-CM

## 2019-10-27 DIAGNOSIS — E13.69 OTHER SPECIFIED DIABETES MELLITUS WITH OTHER SPECIFIED COMPLICATION, WITH LONG-TERM CURRENT USE OF INSULIN (HCC): Primary | ICD-10-CM

## 2019-10-27 DIAGNOSIS — R50.9 FEVER AND CHILLS: ICD-10-CM

## 2019-10-27 PROBLEM — E11.9 DIABETES MELLITUS (HCC): Status: ACTIVE | Noted: 2019-10-27

## 2019-10-27 LAB
ALBUMIN SERPL-MCNC: 4.3 G/DL (ref 3.5–5.2)
ALBUMIN/GLOB SERPL: 1.2 G/DL
ALP SERPL-CCNC: 100 U/L (ref 39–117)
ALT SERPL W P-5'-P-CCNC: 8 U/L (ref 1–33)
ANION GAP SERPL CALCULATED.3IONS-SCNC: 17.3 MMOL/L (ref 5–15)
AST SERPL-CCNC: 9 U/L (ref 1–32)
BASOPHILS # BLD AUTO: 0.08 10*3/MM3 (ref 0–0.2)
BASOPHILS NFR BLD AUTO: 0.4 % (ref 0–1.5)
BILIRUB SERPL-MCNC: 0.3 MG/DL (ref 0.2–1.2)
BILIRUB UR QL STRIP: NEGATIVE
BUN BLD-MCNC: 10 MG/DL (ref 6–20)
BUN/CREAT SERPL: 11.1 (ref 7–25)
CALCIUM SPEC-SCNC: 9.2 MG/DL (ref 8.6–10.5)
CHLORIDE SERPL-SCNC: 98 MMOL/L (ref 98–107)
CLARITY UR: CLEAR
CO2 SERPL-SCNC: 20.7 MMOL/L (ref 22–29)
COLOR UR: YELLOW
CREAT BLD-MCNC: 0.9 MG/DL (ref 0.57–1)
D-LACTATE SERPL-SCNC: 0.6 MMOL/L (ref 0.5–2)
DEPRECATED RDW RBC AUTO: 40.9 FL (ref 37–54)
EOSINOPHIL # BLD AUTO: 0.01 10*3/MM3 (ref 0–0.4)
EOSINOPHIL NFR BLD AUTO: 0.1 % (ref 0.3–6.2)
ERYTHROCYTE [DISTWIDTH] IN BLOOD BY AUTOMATED COUNT: 14 % (ref 12.3–15.4)
GFR SERPL CREATININE-BSD FRML MDRD: 73 ML/MIN/1.73
GLOBULIN UR ELPH-MCNC: 3.5 GM/DL
GLUCOSE BLD-MCNC: 200 MG/DL (ref 65–99)
GLUCOSE UR STRIP-MCNC: ABNORMAL MG/DL
HCG SERPL QL: NEGATIVE
HCT VFR BLD AUTO: 37.6 % (ref 34–46.6)
HGB BLD-MCNC: 12.5 G/DL (ref 12–15.9)
HGB UR QL STRIP.AUTO: NEGATIVE
HOLD SPECIMEN: NORMAL
HOLD SPECIMEN: NORMAL
IMM GRANULOCYTES # BLD AUTO: 0.13 10*3/MM3 (ref 0–0.05)
IMM GRANULOCYTES NFR BLD AUTO: 0.7 % (ref 0–0.5)
KETONES UR QL STRIP: ABNORMAL
LEUKOCYTE ESTERASE UR QL STRIP.AUTO: NEGATIVE
LYMPHOCYTES # BLD AUTO: 1.25 10*3/MM3 (ref 0.7–3.1)
LYMPHOCYTES NFR BLD AUTO: 6.9 % (ref 19.6–45.3)
MCH RBC QN AUTO: 26.8 PG (ref 26.6–33)
MCHC RBC AUTO-ENTMCNC: 33.2 G/DL (ref 31.5–35.7)
MCV RBC AUTO: 80.7 FL (ref 79–97)
MONOCYTES # BLD AUTO: 1.06 10*3/MM3 (ref 0.1–0.9)
MONOCYTES NFR BLD AUTO: 5.9 % (ref 5–12)
NEUTROPHILS # BLD AUTO: 15.52 10*3/MM3 (ref 1.7–7)
NEUTROPHILS NFR BLD AUTO: 86 % (ref 42.7–76)
NITRITE UR QL STRIP: NEGATIVE
NRBC BLD AUTO-RTO: 0.1 /100 WBC (ref 0–0.2)
PH UR STRIP.AUTO: 5.5 [PH] (ref 5–8)
PLATELET # BLD AUTO: 437 10*3/MM3 (ref 140–450)
PMV BLD AUTO: 11.9 FL (ref 6–12)
POTASSIUM BLD-SCNC: 3.8 MMOL/L (ref 3.5–5.2)
PROCALCITONIN SERPL-MCNC: 0.16 NG/ML (ref 0.1–0.25)
PROT SERPL-MCNC: 7.8 G/DL (ref 6–8.5)
PROT UR QL STRIP: ABNORMAL
RBC # BLD AUTO: 4.66 10*6/MM3 (ref 3.77–5.28)
SODIUM BLD-SCNC: 136 MMOL/L (ref 136–145)
SP GR UR STRIP: 1.02 (ref 1–1.03)
UROBILINOGEN UR QL STRIP: ABNORMAL
WBC NRBC COR # BLD: 18.05 10*3/MM3 (ref 3.4–10.8)
WHOLE BLOOD HOLD SPECIMEN: NORMAL
WHOLE BLOOD HOLD SPECIMEN: NORMAL

## 2019-10-27 PROCEDURE — 25010000002 PIPERACILLIN SOD-TAZOBACTAM PER 1 G: Performed by: PHYSICIAN ASSISTANT

## 2019-10-27 PROCEDURE — 80053 COMPREHEN METABOLIC PANEL: CPT | Performed by: PHYSICIAN ASSISTANT

## 2019-10-27 PROCEDURE — 87205 SMEAR GRAM STAIN: CPT | Performed by: PHYSICIAN ASSISTANT

## 2019-10-27 PROCEDURE — 0J973ZZ DRAINAGE OF BACK SUBCUTANEOUS TISSUE AND FASCIA, PERCUTANEOUS APPROACH: ICD-10-PCS | Performed by: PHYSICIAN ASSISTANT

## 2019-10-27 PROCEDURE — 81003 URINALYSIS AUTO W/O SCOPE: CPT | Performed by: PHYSICIAN ASSISTANT

## 2019-10-27 PROCEDURE — 84145 PROCALCITONIN (PCT): CPT | Performed by: PHYSICIAN ASSISTANT

## 2019-10-27 PROCEDURE — 84703 CHORIONIC GONADOTROPIN ASSAY: CPT | Performed by: PHYSICIAN ASSISTANT

## 2019-10-27 PROCEDURE — 85025 COMPLETE CBC W/AUTO DIFF WBC: CPT | Performed by: PHYSICIAN ASSISTANT

## 2019-10-27 PROCEDURE — 87040 BLOOD CULTURE FOR BACTERIA: CPT | Performed by: PHYSICIAN ASSISTANT

## 2019-10-27 PROCEDURE — 83605 ASSAY OF LACTIC ACID: CPT | Performed by: PHYSICIAN ASSISTANT

## 2019-10-27 PROCEDURE — 87186 SC STD MICRODIL/AGAR DIL: CPT | Performed by: PHYSICIAN ASSISTANT

## 2019-10-27 PROCEDURE — 25010000002 VANCOMYCIN 10 G RECONSTITUTED SOLUTION: Performed by: PHYSICIAN ASSISTANT

## 2019-10-27 PROCEDURE — 99285 EMERGENCY DEPT VISIT HI MDM: CPT

## 2019-10-27 PROCEDURE — 87147 CULTURE TYPE IMMUNOLOGIC: CPT | Performed by: PHYSICIAN ASSISTANT

## 2019-10-27 PROCEDURE — 87070 CULTURE OTHR SPECIMN AEROBIC: CPT | Performed by: PHYSICIAN ASSISTANT

## 2019-10-27 RX ORDER — ACETAMINOPHEN 160 MG/5ML
650 SOLUTION ORAL EVERY 4 HOURS PRN
Status: DISCONTINUED | OUTPATIENT
Start: 2019-10-27 | End: 2019-10-31 | Stop reason: HOSPADM

## 2019-10-27 RX ORDER — SODIUM CHLORIDE 0.9 % (FLUSH) 0.9 %
10 SYRINGE (ML) INJECTION AS NEEDED
Status: DISCONTINUED | OUTPATIENT
Start: 2019-10-27 | End: 2019-10-31 | Stop reason: HOSPADM

## 2019-10-27 RX ORDER — SODIUM CHLORIDE 0.9 % (FLUSH) 0.9 %
10 SYRINGE (ML) INJECTION EVERY 12 HOURS SCHEDULED
Status: DISCONTINUED | OUTPATIENT
Start: 2019-10-27 | End: 2019-10-31 | Stop reason: HOSPADM

## 2019-10-27 RX ORDER — ONDANSETRON 4 MG/1
4 TABLET, FILM COATED ORAL EVERY 6 HOURS PRN
Status: DISCONTINUED | OUTPATIENT
Start: 2019-10-27 | End: 2019-10-31 | Stop reason: HOSPADM

## 2019-10-27 RX ORDER — CALCIUM CARBONATE 200(500)MG
2 TABLET,CHEWABLE ORAL 2 TIMES DAILY PRN
Status: DISCONTINUED | OUTPATIENT
Start: 2019-10-27 | End: 2019-10-31 | Stop reason: HOSPADM

## 2019-10-27 RX ORDER — SODIUM CHLORIDE 9 MG/ML
125 INJECTION, SOLUTION INTRAVENOUS CONTINUOUS
Status: DISCONTINUED | OUTPATIENT
Start: 2019-10-27 | End: 2019-10-29

## 2019-10-27 RX ORDER — BISACODYL 5 MG/1
5 TABLET, DELAYED RELEASE ORAL DAILY PRN
Status: DISCONTINUED | OUTPATIENT
Start: 2019-10-27 | End: 2019-10-31 | Stop reason: HOSPADM

## 2019-10-27 RX ORDER — ACETAMINOPHEN 325 MG/1
650 TABLET ORAL EVERY 4 HOURS PRN
Status: DISCONTINUED | OUTPATIENT
Start: 2019-10-27 | End: 2019-10-31 | Stop reason: HOSPADM

## 2019-10-27 RX ORDER — ACETAMINOPHEN 500 MG
1000 TABLET ORAL ONCE
Status: COMPLETED | OUTPATIENT
Start: 2019-10-27 | End: 2019-10-27

## 2019-10-27 RX ORDER — ACETAMINOPHEN 650 MG/1
650 SUPPOSITORY RECTAL EVERY 4 HOURS PRN
Status: DISCONTINUED | OUTPATIENT
Start: 2019-10-27 | End: 2019-10-31 | Stop reason: HOSPADM

## 2019-10-27 RX ORDER — LIDOCAINE HYDROCHLORIDE AND EPINEPHRINE 10; 10 MG/ML; UG/ML
10 INJECTION, SOLUTION INFILTRATION; PERINEURAL ONCE
Status: DISCONTINUED | OUTPATIENT
Start: 2019-10-27 | End: 2019-10-28

## 2019-10-27 RX ORDER — ONDANSETRON 2 MG/ML
4 INJECTION INTRAMUSCULAR; INTRAVENOUS EVERY 6 HOURS PRN
Status: DISCONTINUED | OUTPATIENT
Start: 2019-10-27 | End: 2019-10-31 | Stop reason: HOSPADM

## 2019-10-27 RX ADMIN — SODIUM CHLORIDE 1000 ML: 9 INJECTION, SOLUTION INTRAVENOUS at 22:57

## 2019-10-27 RX ADMIN — SODIUM CHLORIDE 1000 ML: 9 INJECTION, SOLUTION INTRAVENOUS at 21:56

## 2019-10-27 RX ADMIN — ACETAMINOPHEN 1000 MG: 500 TABLET, FILM COATED ORAL at 23:00

## 2019-10-27 RX ADMIN — VANCOMYCIN HYDROCHLORIDE 1500 MG: 10 INJECTION, POWDER, LYOPHILIZED, FOR SOLUTION INTRAVENOUS at 23:36

## 2019-10-27 RX ADMIN — TAZOBACTAM SODIUM AND PIPERACILLIN SODIUM 3.38 G: 375; 3 INJECTION, SOLUTION INTRAVENOUS at 23:00

## 2019-10-28 PROBLEM — A41.9 SEPSIS: Status: ACTIVE | Noted: 2019-10-28

## 2019-10-28 PROBLEM — L03.90 CELLULITIS: Status: ACTIVE | Noted: 2019-10-28

## 2019-10-28 LAB
ANION GAP SERPL CALCULATED.3IONS-SCNC: 14.8 MMOL/L (ref 5–15)
BUN BLD-MCNC: 8 MG/DL (ref 6–20)
BUN/CREAT SERPL: 13.1 (ref 7–25)
CALCIUM SPEC-SCNC: 8.1 MG/DL (ref 8.6–10.5)
CHLORIDE SERPL-SCNC: 105 MMOL/L (ref 98–107)
CO2 SERPL-SCNC: 18.2 MMOL/L (ref 22–29)
CREAT BLD-MCNC: 0.61 MG/DL (ref 0.57–1)
DEPRECATED RDW RBC AUTO: 40.1 FL (ref 37–54)
ERYTHROCYTE [DISTWIDTH] IN BLOOD BY AUTOMATED COUNT: 14 % (ref 12.3–15.4)
GFR SERPL CREATININE-BSD FRML MDRD: 114 ML/MIN/1.73
GLUCOSE BLD-MCNC: 116 MG/DL (ref 65–99)
GLUCOSE BLDC GLUCOMTR-MCNC: 109 MG/DL (ref 70–130)
GLUCOSE BLDC GLUCOMTR-MCNC: 125 MG/DL (ref 70–130)
GLUCOSE BLDC GLUCOMTR-MCNC: 146 MG/DL (ref 70–130)
GLUCOSE BLDC GLUCOMTR-MCNC: 187 MG/DL (ref 70–130)
GLUCOSE BLDC GLUCOMTR-MCNC: 200 MG/DL (ref 70–130)
HBA1C MFR BLD: 10.27 % (ref 4.8–5.6)
HCT VFR BLD AUTO: 33.6 % (ref 34–46.6)
HGB BLD-MCNC: 10.9 G/DL (ref 12–15.9)
MCH RBC QN AUTO: 26 PG (ref 26.6–33)
MCHC RBC AUTO-ENTMCNC: 32.4 G/DL (ref 31.5–35.7)
MCV RBC AUTO: 80.2 FL (ref 79–97)
PLATELET # BLD AUTO: 420 10*3/MM3 (ref 140–450)
PMV BLD AUTO: 11.9 FL (ref 6–12)
POTASSIUM BLD-SCNC: 3.5 MMOL/L (ref 3.5–5.2)
RBC # BLD AUTO: 4.19 10*6/MM3 (ref 3.77–5.28)
SODIUM BLD-SCNC: 138 MMOL/L (ref 136–145)
WBC NRBC COR # BLD: 18.62 10*3/MM3 (ref 3.4–10.8)

## 2019-10-28 PROCEDURE — 25010000002 ONDANSETRON PER 1 MG: Performed by: NURSE PRACTITIONER

## 2019-10-28 PROCEDURE — 63710000001 DIPHENHYDRAMINE PER 50 MG: Performed by: NURSE PRACTITIONER

## 2019-10-28 PROCEDURE — 85027 COMPLETE CBC AUTOMATED: CPT | Performed by: NURSE PRACTITIONER

## 2019-10-28 PROCEDURE — 82962 GLUCOSE BLOOD TEST: CPT

## 2019-10-28 PROCEDURE — 25010000003 LIDOCAINE 1 % SOLUTION: Performed by: PHYSICIAN ASSISTANT

## 2019-10-28 PROCEDURE — 25010000002 VANCOMYCIN 10 G RECONSTITUTED SOLUTION: Performed by: NURSE PRACTITIONER

## 2019-10-28 PROCEDURE — 80048 BASIC METABOLIC PNL TOTAL CA: CPT | Performed by: NURSE PRACTITIONER

## 2019-10-28 PROCEDURE — 36415 COLL VENOUS BLD VENIPUNCTURE: CPT | Performed by: NURSE PRACTITIONER

## 2019-10-28 PROCEDURE — 83036 HEMOGLOBIN GLYCOSYLATED A1C: CPT | Performed by: NURSE PRACTITIONER

## 2019-10-28 PROCEDURE — 25010000002 PIPERACILLIN SOD-TAZOBACTAM PER 1 G: Performed by: NURSE PRACTITIONER

## 2019-10-28 RX ORDER — LIDOCAINE HYDROCHLORIDE 10 MG/ML
10 INJECTION, SOLUTION INFILTRATION; PERINEURAL ONCE
Status: COMPLETED | OUTPATIENT
Start: 2019-10-28 | End: 2019-10-28

## 2019-10-28 RX ORDER — ATORVASTATIN CALCIUM 10 MG/1
10 TABLET, FILM COATED ORAL DAILY
Status: DISCONTINUED | OUTPATIENT
Start: 2019-10-28 | End: 2019-10-31 | Stop reason: HOSPADM

## 2019-10-28 RX ORDER — DIPHENHYDRAMINE HCL 25 MG
25 CAPSULE ORAL EVERY 6 HOURS PRN
Status: DISCONTINUED | OUTPATIENT
Start: 2019-10-28 | End: 2019-10-31 | Stop reason: HOSPADM

## 2019-10-28 RX ORDER — LIDOCAINE HYDROCHLORIDE 10 MG/ML
2 INJECTION, SOLUTION EPIDURAL; INFILTRATION; INTRACAUDAL; PERINEURAL ONCE
Status: DISCONTINUED | OUTPATIENT
Start: 2019-10-28 | End: 2019-10-28

## 2019-10-28 RX ORDER — NICOTINE POLACRILEX 4 MG
15 LOZENGE BUCCAL
Status: DISCONTINUED | OUTPATIENT
Start: 2019-10-28 | End: 2019-10-31 | Stop reason: HOSPADM

## 2019-10-28 RX ORDER — DEXTROSE MONOHYDRATE 25 G/50ML
25 INJECTION, SOLUTION INTRAVENOUS
Status: DISCONTINUED | OUTPATIENT
Start: 2019-10-28 | End: 2019-10-31 | Stop reason: HOSPADM

## 2019-10-28 RX ADMIN — TAZOBACTAM SODIUM AND PIPERACILLIN SODIUM 3.38 G: 375; 3 INJECTION, SOLUTION INTRAVENOUS at 06:01

## 2019-10-28 RX ADMIN — SODIUM CHLORIDE, PRESERVATIVE FREE 10 ML: 5 INJECTION INTRAVENOUS at 20:06

## 2019-10-28 RX ADMIN — VANCOMYCIN HYDROCHLORIDE 1250 MG: 10 INJECTION, POWDER, LYOPHILIZED, FOR SOLUTION INTRAVENOUS at 22:10

## 2019-10-28 RX ADMIN — LIDOCAINE HYDROCHLORIDE 2 ML: 10 INJECTION, SOLUTION INFILTRATION; PERINEURAL at 00:32

## 2019-10-28 RX ADMIN — Medication: at 13:56

## 2019-10-28 RX ADMIN — SODIUM CHLORIDE 500 ML: 9 INJECTION, SOLUTION INTRAVENOUS at 06:02

## 2019-10-28 RX ADMIN — TAZOBACTAM SODIUM AND PIPERACILLIN SODIUM 3.38 G: 375; 3 INJECTION, SOLUTION INTRAVENOUS at 16:18

## 2019-10-28 RX ADMIN — SODIUM CHLORIDE 125 ML/HR: 9 INJECTION, SOLUTION INTRAVENOUS at 18:39

## 2019-10-28 RX ADMIN — TAZOBACTAM SODIUM AND PIPERACILLIN SODIUM 3.38 G: 375; 3 INJECTION, SOLUTION INTRAVENOUS at 22:10

## 2019-10-28 RX ADMIN — SODIUM CHLORIDE 100 ML/HR: 9 INJECTION, SOLUTION INTRAVENOUS at 02:17

## 2019-10-28 RX ADMIN — ONDANSETRON 4 MG: 2 INJECTION INTRAMUSCULAR; INTRAVENOUS at 04:00

## 2019-10-28 RX ADMIN — SODIUM CHLORIDE 125 ML/HR: 9 INJECTION, SOLUTION INTRAVENOUS at 11:39

## 2019-10-28 RX ADMIN — DIPHENHYDRAMINE HYDROCHLORIDE 25 MG: 25 CAPSULE ORAL at 02:35

## 2019-10-28 RX ADMIN — VANCOMYCIN HYDROCHLORIDE 1250 MG: 10 INJECTION, POWDER, LYOPHILIZED, FOR SOLUTION INTRAVENOUS at 11:38

## 2019-10-28 RX ADMIN — SODIUM CHLORIDE, PRESERVATIVE FREE 10 ML: 5 INJECTION INTRAVENOUS at 09:06

## 2019-10-29 PROBLEM — E10.9 DM (DIABETES MELLITUS), TYPE 1: Status: ACTIVE | Noted: 2017-05-12

## 2019-10-29 PROBLEM — E83.39 HYPOPHOSPHATEMIA: Status: ACTIVE | Noted: 2019-10-29

## 2019-10-29 PROBLEM — E87.6 HYPOPOTASSEMIA: Status: ACTIVE | Noted: 2019-10-29

## 2019-10-29 LAB
ALBUMIN SERPL-MCNC: 2.8 G/DL (ref 3.5–5.2)
ANION GAP SERPL CALCULATED.3IONS-SCNC: 10.7 MMOL/L (ref 5–15)
BUN BLD-MCNC: 3 MG/DL (ref 6–20)
BUN/CREAT SERPL: 4.8 (ref 7–25)
CALCIUM SPEC-SCNC: 7.4 MG/DL (ref 8.6–10.5)
CHLORIDE SERPL-SCNC: 109 MMOL/L (ref 98–107)
CO2 SERPL-SCNC: 21.3 MMOL/L (ref 22–29)
CREAT BLD-MCNC: 0.63 MG/DL (ref 0.57–1)
DEPRECATED RDW RBC AUTO: 41.7 FL (ref 37–54)
ERYTHROCYTE [DISTWIDTH] IN BLOOD BY AUTOMATED COUNT: 14.2 % (ref 12.3–15.4)
GFR SERPL CREATININE-BSD FRML MDRD: 110 ML/MIN/1.73
GLUCOSE BLD-MCNC: 148 MG/DL (ref 65–99)
GLUCOSE BLDC GLUCOMTR-MCNC: 113 MG/DL (ref 70–130)
GLUCOSE BLDC GLUCOMTR-MCNC: 136 MG/DL (ref 70–130)
GLUCOSE BLDC GLUCOMTR-MCNC: 210 MG/DL (ref 70–130)
GLUCOSE BLDC GLUCOMTR-MCNC: 226 MG/DL (ref 70–130)
HCT VFR BLD AUTO: 28.6 % (ref 34–46.6)
HGB BLD-MCNC: 9.3 G/DL (ref 12–15.9)
MCH RBC QN AUTO: 26.4 PG (ref 26.6–33)
MCHC RBC AUTO-ENTMCNC: 32.5 G/DL (ref 31.5–35.7)
MCV RBC AUTO: 81.3 FL (ref 79–97)
PHOSPHATE SERPL-MCNC: 2.3 MG/DL (ref 2.5–4.5)
PLATELET # BLD AUTO: 326 10*3/MM3 (ref 140–450)
PMV BLD AUTO: 10.9 FL (ref 6–12)
POTASSIUM BLD-SCNC: 3.2 MMOL/L (ref 3.5–5.2)
POTASSIUM BLD-SCNC: 3.6 MMOL/L (ref 3.5–5.2)
RBC # BLD AUTO: 3.52 10*6/MM3 (ref 3.77–5.28)
SODIUM BLD-SCNC: 141 MMOL/L (ref 136–145)
WBC NRBC COR # BLD: 12.84 10*3/MM3 (ref 3.4–10.8)

## 2019-10-29 PROCEDURE — 80069 RENAL FUNCTION PANEL: CPT | Performed by: HOSPITALIST

## 2019-10-29 PROCEDURE — 25010000002 VANCOMYCIN 10 G RECONSTITUTED SOLUTION: Performed by: NURSE PRACTITIONER

## 2019-10-29 PROCEDURE — 84132 ASSAY OF SERUM POTASSIUM: CPT | Performed by: HOSPITALIST

## 2019-10-29 PROCEDURE — 94799 UNLISTED PULMONARY SVC/PX: CPT

## 2019-10-29 PROCEDURE — 85027 COMPLETE CBC AUTOMATED: CPT | Performed by: HOSPITALIST

## 2019-10-29 PROCEDURE — 99254 IP/OBS CNSLTJ NEW/EST MOD 60: CPT | Performed by: INTERNAL MEDICINE

## 2019-10-29 PROCEDURE — 25010000002 ONDANSETRON PER 1 MG: Performed by: NURSE PRACTITIONER

## 2019-10-29 PROCEDURE — 82962 GLUCOSE BLOOD TEST: CPT

## 2019-10-29 PROCEDURE — 25010000002 PROMETHAZINE PER 50 MG: Performed by: HOSPITALIST

## 2019-10-29 PROCEDURE — 25010000003 POTASSIUM CHLORIDE 10 MEQ/100ML SOLUTION: Performed by: HOSPITALIST

## 2019-10-29 PROCEDURE — 25010000002 PIPERACILLIN SOD-TAZOBACTAM PER 1 G: Performed by: NURSE PRACTITIONER

## 2019-10-29 RX ORDER — POTASSIUM CHLORIDE 750 MG/1
40 CAPSULE, EXTENDED RELEASE ORAL AS NEEDED
Status: DISCONTINUED | OUTPATIENT
Start: 2019-10-29 | End: 2019-10-31 | Stop reason: HOSPADM

## 2019-10-29 RX ORDER — POTASSIUM CHLORIDE 7.45 MG/ML
10 INJECTION INTRAVENOUS
Status: DISCONTINUED | OUTPATIENT
Start: 2019-10-29 | End: 2019-10-31 | Stop reason: HOSPADM

## 2019-10-29 RX ORDER — PROMETHAZINE HYDROCHLORIDE 25 MG/ML
12.5 INJECTION, SOLUTION INTRAMUSCULAR; INTRAVENOUS EVERY 6 HOURS PRN
Status: DISCONTINUED | OUTPATIENT
Start: 2019-10-29 | End: 2019-10-31 | Stop reason: HOSPADM

## 2019-10-29 RX ORDER — POTASSIUM CHLORIDE 1.5 G/1.77G
40 POWDER, FOR SOLUTION ORAL AS NEEDED
Status: DISCONTINUED | OUTPATIENT
Start: 2019-10-29 | End: 2019-10-31 | Stop reason: HOSPADM

## 2019-10-29 RX ORDER — SODIUM CHLORIDE, SODIUM LACTATE, POTASSIUM CHLORIDE, CALCIUM CHLORIDE 600; 310; 30; 20 MG/100ML; MG/100ML; MG/100ML; MG/100ML
100 INJECTION, SOLUTION INTRAVENOUS CONTINUOUS
Status: DISCONTINUED | OUTPATIENT
Start: 2019-10-29 | End: 2019-10-30

## 2019-10-29 RX ADMIN — ATORVASTATIN CALCIUM 10 MG: 10 TABLET, FILM COATED ORAL at 20:48

## 2019-10-29 RX ADMIN — POTASSIUM CHLORIDE 10 MEQ: 7.46 INJECTION, SOLUTION INTRAVENOUS at 13:55

## 2019-10-29 RX ADMIN — SODIUM CHLORIDE, POTASSIUM CHLORIDE, SODIUM LACTATE AND CALCIUM CHLORIDE 100 ML/HR: 600; 310; 30; 20 INJECTION, SOLUTION INTRAVENOUS at 16:14

## 2019-10-29 RX ADMIN — Medication: at 16:23

## 2019-10-29 RX ADMIN — POTASSIUM CHLORIDE 40 MEQ: 750 CAPSULE, EXTENDED RELEASE ORAL at 22:20

## 2019-10-29 RX ADMIN — SODIUM CHLORIDE, PRESERVATIVE FREE 10 ML: 5 INJECTION INTRAVENOUS at 08:22

## 2019-10-29 RX ADMIN — POTASSIUM CHLORIDE 10 MEQ: 7.46 INJECTION, SOLUTION INTRAVENOUS at 16:17

## 2019-10-29 RX ADMIN — POTASSIUM CHLORIDE 10 MEQ: 7.46 INJECTION, SOLUTION INTRAVENOUS at 11:50

## 2019-10-29 RX ADMIN — ACETAMINOPHEN 650 MG: 325 TABLET, FILM COATED ORAL at 01:23

## 2019-10-29 RX ADMIN — TAZOBACTAM SODIUM AND PIPERACILLIN SODIUM 3.38 G: 375; 3 INJECTION, SOLUTION INTRAVENOUS at 06:47

## 2019-10-29 RX ADMIN — ONDANSETRON 4 MG: 2 INJECTION INTRAMUSCULAR; INTRAVENOUS at 06:52

## 2019-10-29 RX ADMIN — VANCOMYCIN HYDROCHLORIDE 1250 MG: 10 INJECTION, POWDER, LYOPHILIZED, FOR SOLUTION INTRAVENOUS at 11:50

## 2019-10-29 RX ADMIN — SODIUM CHLORIDE 125 ML/HR: 9 INJECTION, SOLUTION INTRAVENOUS at 11:50

## 2019-10-29 RX ADMIN — SODIUM CHLORIDE 125 ML/HR: 9 INJECTION, SOLUTION INTRAVENOUS at 03:54

## 2019-10-29 RX ADMIN — VANCOMYCIN HYDROCHLORIDE 1250 MG: 10 INJECTION, POWDER, LYOPHILIZED, FOR SOLUTION INTRAVENOUS at 22:20

## 2019-10-29 RX ADMIN — POTASSIUM CHLORIDE 10 MEQ: 7.46 INJECTION, SOLUTION INTRAVENOUS at 10:21

## 2019-10-29 RX ADMIN — POTASSIUM CHLORIDE 40 MEQ: 750 CAPSULE, EXTENDED RELEASE ORAL at 06:47

## 2019-10-29 RX ADMIN — PROMETHAZINE HYDROCHLORIDE 12.5 MG: 25 INJECTION INTRAMUSCULAR; INTRAVENOUS at 08:22

## 2019-10-30 LAB
ANION GAP SERPL CALCULATED.3IONS-SCNC: 7.9 MMOL/L (ref 5–15)
BACTERIA SPEC AEROBE CULT: ABNORMAL
BUN BLD-MCNC: 2 MG/DL (ref 6–20)
BUN/CREAT SERPL: 3.6 (ref 7–25)
CALCIUM SPEC-SCNC: 8 MG/DL (ref 8.6–10.5)
CHLORIDE SERPL-SCNC: 112 MMOL/L (ref 98–107)
CO2 SERPL-SCNC: 23.1 MMOL/L (ref 22–29)
CREAT BLD-MCNC: 0.56 MG/DL (ref 0.57–1)
DEPRECATED RDW RBC AUTO: 41.1 FL (ref 37–54)
ERYTHROCYTE [DISTWIDTH] IN BLOOD BY AUTOMATED COUNT: 14.2 % (ref 12.3–15.4)
GFR SERPL CREATININE-BSD FRML MDRD: 126 ML/MIN/1.73
GLUCOSE BLD-MCNC: 125 MG/DL (ref 65–99)
GLUCOSE BLDC GLUCOMTR-MCNC: 113 MG/DL (ref 70–130)
GLUCOSE BLDC GLUCOMTR-MCNC: 119 MG/DL (ref 70–130)
GLUCOSE BLDC GLUCOMTR-MCNC: 157 MG/DL (ref 70–130)
GLUCOSE BLDC GLUCOMTR-MCNC: 97 MG/DL (ref 70–130)
GRAM STN SPEC: ABNORMAL
HCT VFR BLD AUTO: 30.1 % (ref 34–46.6)
HGB BLD-MCNC: 9.7 G/DL (ref 12–15.9)
MCH RBC QN AUTO: 26 PG (ref 26.6–33)
MCHC RBC AUTO-ENTMCNC: 32.2 G/DL (ref 31.5–35.7)
MCV RBC AUTO: 80.7 FL (ref 79–97)
PHOSPHATE SERPL-MCNC: 2.2 MG/DL (ref 2.5–4.5)
PHOSPHATE SERPL-MCNC: 2.5 MG/DL (ref 2.5–4.5)
PLATELET # BLD AUTO: 401 10*3/MM3 (ref 140–450)
PMV BLD AUTO: 11.3 FL (ref 6–12)
POTASSIUM BLD-SCNC: 3.7 MMOL/L (ref 3.5–5.2)
RBC # BLD AUTO: 3.73 10*6/MM3 (ref 3.77–5.28)
SODIUM BLD-SCNC: 143 MMOL/L (ref 136–145)
VANCOMYCIN TROUGH SERPL-MCNC: 11.8 MCG/ML (ref 5–20)
WBC NRBC COR # BLD: 9.44 10*3/MM3 (ref 3.4–10.8)

## 2019-10-30 PROCEDURE — 80202 ASSAY OF VANCOMYCIN: CPT | Performed by: NURSE PRACTITIONER

## 2019-10-30 PROCEDURE — 82962 GLUCOSE BLOOD TEST: CPT

## 2019-10-30 PROCEDURE — 25010000002 VANCOMYCIN 10 G RECONSTITUTED SOLUTION: Performed by: NURSE PRACTITIONER

## 2019-10-30 PROCEDURE — 84100 ASSAY OF PHOSPHORUS: CPT | Performed by: HOSPITALIST

## 2019-10-30 PROCEDURE — 99252 IP/OBS CONSLTJ NEW/EST SF 35: CPT | Performed by: SURGERY

## 2019-10-30 PROCEDURE — 85027 COMPLETE CBC AUTOMATED: CPT | Performed by: HOSPITALIST

## 2019-10-30 PROCEDURE — 25010000002 ONDANSETRON PER 1 MG: Performed by: NURSE PRACTITIONER

## 2019-10-30 PROCEDURE — 80048 BASIC METABOLIC PNL TOTAL CA: CPT | Performed by: HOSPITALIST

## 2019-10-30 RX ORDER — SULFAMETHOXAZOLE AND TRIMETHOPRIM 800; 160 MG/1; MG/1
1 TABLET ORAL EVERY 12 HOURS SCHEDULED
Status: DISCONTINUED | OUTPATIENT
Start: 2019-10-30 | End: 2019-10-31 | Stop reason: HOSPADM

## 2019-10-30 RX ADMIN — SODIUM CHLORIDE, POTASSIUM CHLORIDE, SODIUM LACTATE AND CALCIUM CHLORIDE 100 ML/HR: 600; 310; 30; 20 INJECTION, SOLUTION INTRAVENOUS at 14:42

## 2019-10-30 RX ADMIN — POTASSIUM & SODIUM PHOSPHATES POWDER PACK 280-160-250 MG 2 PACKET: 280-160-250 PACK at 10:46

## 2019-10-30 RX ADMIN — POTASSIUM & SODIUM PHOSPHATES POWDER PACK 280-160-250 MG 2 PACKET: 280-160-250 PACK at 21:16

## 2019-10-30 RX ADMIN — ONDANSETRON 4 MG: 2 INJECTION INTRAMUSCULAR; INTRAVENOUS at 08:28

## 2019-10-30 RX ADMIN — SULFAMETHOXAZOLE AND TRIMETHOPRIM 160 MG: 800; 160 TABLET ORAL at 20:01

## 2019-10-30 RX ADMIN — SODIUM CHLORIDE, PRESERVATIVE FREE 10 ML: 5 INJECTION INTRAVENOUS at 20:01

## 2019-10-30 RX ADMIN — SODIUM CHLORIDE, PRESERVATIVE FREE 10 ML: 5 INJECTION INTRAVENOUS at 08:29

## 2019-10-30 RX ADMIN — ATORVASTATIN CALCIUM 10 MG: 10 TABLET, FILM COATED ORAL at 08:29

## 2019-10-30 RX ADMIN — VANCOMYCIN HYDROCHLORIDE 1250 MG: 10 INJECTION, POWDER, LYOPHILIZED, FOR SOLUTION INTRAVENOUS at 11:42

## 2019-10-30 RX ADMIN — SODIUM CHLORIDE, POTASSIUM CHLORIDE, SODIUM LACTATE AND CALCIUM CHLORIDE 100 ML/HR: 600; 310; 30; 20 INJECTION, SOLUTION INTRAVENOUS at 02:56

## 2019-10-31 VITALS
TEMPERATURE: 98.2 F | DIASTOLIC BLOOD PRESSURE: 79 MMHG | RESPIRATION RATE: 20 BRPM | WEIGHT: 151.6 LBS | BODY MASS INDEX: 27.9 KG/M2 | OXYGEN SATURATION: 94 % | HEART RATE: 111 BPM | HEIGHT: 62 IN | SYSTOLIC BLOOD PRESSURE: 134 MMHG

## 2019-10-31 LAB
B-HCG UR QL: NEGATIVE
CHOLEST SERPL-MCNC: 87 MG/DL (ref 0–200)
GLUCOSE BLDC GLUCOMTR-MCNC: 143 MG/DL (ref 70–130)
GLUCOSE BLDC GLUCOMTR-MCNC: 157 MG/DL (ref 70–130)
HDLC SERPL-MCNC: 24 MG/DL (ref 40–60)
LDLC SERPL CALC-MCNC: 47 MG/DL (ref 0–100)
LDLC/HDLC SERPL: 1.94 {RATIO}
PHOSPHATE SERPL-MCNC: 3.8 MG/DL (ref 2.5–4.5)
T4 FREE SERPL-MCNC: 1.54 NG/DL (ref 0.93–1.7)
TRIGL SERPL-MCNC: 82 MG/DL (ref 0–150)
TSH SERPL DL<=0.05 MIU/L-ACNC: 2.6 UIU/ML (ref 0.27–4.2)
VLDLC SERPL-MCNC: 16.4 MG/DL (ref 5–40)

## 2019-10-31 PROCEDURE — 84100 ASSAY OF PHOSPHORUS: CPT | Performed by: HOSPITALIST

## 2019-10-31 PROCEDURE — 84443 ASSAY THYROID STIM HORMONE: CPT | Performed by: INTERNAL MEDICINE

## 2019-10-31 PROCEDURE — 84439 ASSAY OF FREE THYROXINE: CPT | Performed by: INTERNAL MEDICINE

## 2019-10-31 PROCEDURE — 25010000002 ONDANSETRON PER 1 MG: Performed by: NURSE PRACTITIONER

## 2019-10-31 PROCEDURE — 25010000002 PROMETHAZINE PER 50 MG: Performed by: HOSPITALIST

## 2019-10-31 PROCEDURE — 82962 GLUCOSE BLOOD TEST: CPT

## 2019-10-31 PROCEDURE — 81025 URINE PREGNANCY TEST: CPT | Performed by: HOSPITALIST

## 2019-10-31 PROCEDURE — 99231 SBSQ HOSP IP/OBS SF/LOW 25: CPT | Performed by: SURGERY

## 2019-10-31 PROCEDURE — 99232 SBSQ HOSP IP/OBS MODERATE 35: CPT | Performed by: INTERNAL MEDICINE

## 2019-10-31 PROCEDURE — 80061 LIPID PANEL: CPT | Performed by: INTERNAL MEDICINE

## 2019-10-31 RX ORDER — SULFAMETHOXAZOLE AND TRIMETHOPRIM 800; 160 MG/1; MG/1
1 TABLET ORAL EVERY 12 HOURS SCHEDULED
Qty: 18 TABLET | Refills: 0 | Status: SHIPPED | OUTPATIENT
Start: 2019-10-31 | End: 2019-11-09

## 2019-10-31 RX ADMIN — SULFAMETHOXAZOLE AND TRIMETHOPRIM 160 MG: 800; 160 TABLET ORAL at 11:22

## 2019-10-31 RX ADMIN — PROMETHAZINE HYDROCHLORIDE 12.5 MG: 25 INJECTION INTRAMUSCULAR; INTRAVENOUS at 08:30

## 2019-10-31 RX ADMIN — ATORVASTATIN CALCIUM 10 MG: 10 TABLET, FILM COATED ORAL at 11:22

## 2019-10-31 RX ADMIN — SODIUM CHLORIDE, PRESERVATIVE FREE 10 ML: 5 INJECTION INTRAVENOUS at 08:34

## 2019-10-31 RX ADMIN — ONDANSETRON 4 MG: 2 INJECTION INTRAMUSCULAR; INTRAVENOUS at 06:50

## 2019-11-01 ENCOUNTER — READMISSION MANAGEMENT (OUTPATIENT)
Dept: CALL CENTER | Facility: HOSPITAL | Age: 29
End: 2019-11-01

## 2019-11-01 LAB
BACTERIA SPEC AEROBE CULT: NORMAL
BACTERIA SPEC AEROBE CULT: NORMAL

## 2019-11-01 NOTE — OUTREACH NOTE
Prep Survey      Responses   Facility patient discharged from?  [facility]   Is patient eligible?  Yes   Discharge diagnosis  cellulitis,  small abscess,  I&D   Does the patient have one of the following disease processes/diagnoses(primary or secondary)?  Other   Does the patient have Home health ordered?  No   Is there a DME ordered?  No   Prep survey completed?  Yes          Brandi Canales RN

## 2019-11-01 NOTE — PAYOR COMM NOTE
"Kati Gómez (29 y.o. Female)     ATTN: MELISSA    REF#[id number]    D/C SUMMARY      Date of Birth Social Security Number Address Home Phone MRN    [id number]  [facility] [place] [address] [phone] [id number]    [employer] Marital Status          Unknown Single       Admission Date Admission Type Admitting Provider Attending Provider Department, Room/Bed    10/27/19 Emergency Sae Torres MD  [address], [address]/1    Discharge Date Discharge Disposition Discharge Destination        10/31/2019 Home or Self Care              Attending Provider:  (none)   Allergies:  No Known Drug Allergy    Isolation:  None   Infection:  None   Code Status:  Prior    Ht:  157.5 cm (62\")   Wt:  68.8 kg (151 lb 9.6 oz)    Admission Cmt:  None   Principal Problem:  Cellulitis [L03.90]                 Active Insurance as of 10/27/2019     Primary Coverage     Payor Plan Insurance Group Employer/Plan Group    ANTHEM BLUE CROSS [facility] [employer] [facility] [id number]     Payor Plan Address Payor Plan Phone Number Payor Plan Fax Number Effective Dates    [address] [phone]  1/1/2017 - None Entered    [place] [address]       Subscriber Name Subscriber Birth Date Member ID       KATI GÓMEZ 1990 [id number]                 Emergency Contacts      (Rel.) Home Phone Work Phone Mobile Phone    Contacts,No [phone] -- --               Discharge Summary      Moris Zamudio MD at 10/31/19 [address] [place] [address] [facility]    Date of Discharge:  10/31/2019    PCP: Patricia Milner MD    Discharge Diagnosis:   Active Hospital Problems    Diagnosis  POA   • **Cellulitis [L03.90]  Unknown   • Hypopotassemia [E87.6]  Unknown   • Hypophosphatemia [E83.39]  Unknown   • Sepsis (CMS/HCC) [A41.9]  Unknown   • DM (diabetes mellitus), type 1 (CMS/HCC) [E10.9]  Unknown      Resolved Hospital Problems   No " resolved problems to display.      Consults     Date and Time Order Name Status Description    10/30/2019 1525 Inpatient General Surgery Consult Completed     10/29/2019 1411 Inpatient Endocrinology Consult      10/27/2019 2520 [facility] (on-call MD unless specified) Details Completed         Hospital Course  Please see history and physical for details. Patient is a 29 y.o. female admitted with low back cellulitis and a small abscess that had I+D in the emergency department.  She was initially started on vancomycin and Zosyn and wound culture grew staph aureus susceptible to clindamycin, erythromycin, oxacillin, rifampin, Bactrim, vancomycin.  She was switched over to Bactrim p.o.  General surgery saw the patient and followed closely for possible further intervention however after observation it looks like she is responding well to antibiotics and they do not feel that there is any undrained fluid collection.  They recommend continuing antibiotics for at least 10 days, dry dressing changes as needed (for drainage) and follow-up in the office for a wound check in a couple of weeks.  She has been on IV vancomycin for a few days and then Bactrim since yesterday and she will be discharged on about 9 more days of Bactrim.    She has had for the last couple of days morning nausea that resolved and she feels much better for the rest of the day.  She feels fine now and would like to go home.  hCG is negative.  Discussed with her possible further work-up for etiologies such as gastroparesis versus gallbladder versus other.  Patient however would very much like to go home today.  Informed patient if symptoms persist could continue work-up with gallbladder ultrasound or gastric emptying scan or GI consultation.    Patient was followed by Dr. Zaragoza for diabetes management.    I discussed the patient's findings and my recommendations with patient, family and nursing staff.    Condition on Discharge: Improved. No chest pain, shortness  of breath. Tolerating PO.    Temp:  [98 °F (36.7 °C)-99 °F (37.2 °C)] 98.2 °F (36.8 °C)  Heart Rate:  [100-126] 111  Resp:  [16-20] 20  BP: (107-134)/(74-80) 134/79  Body mass index is 27.73 kg/m².    Physical Exam   Constitutional: She is oriented to person, place, and time. No distress.   Cardiovascular: Normal rate and regular rhythm.   Pulmonary/Chest: Effort normal and breath sounds normal. No respiratory distress.   Abdominal: Soft. Bowel sounds are normal. There is no tenderness. There is no rebound and no guarding.   Musculoskeletal: She exhibits no edema.   Neurological: She is alert and oriented to person, place, and time.   Skin: Skin is warm and dry.   Drainage much less.  Mild surrounding erythema.   Psychiatric: She has a normal mood and affect. Her behavior is normal.   Nursing note and vitals reviewed.       Discharge Medications      New Medications      Instructions Start Date   sulfamethoxazole-trimethoprim 800-160 MG per tablet  Commonly known as:  BACTRIM DS,SEPTRA DS   1 tablet, Oral, Every 12 Hours Scheduled         Continue These Medications      Instructions Start Date   atorvastatin 10 MG tablet  Commonly known as:  LIPITOR   10 mg, Oral, Daily      insulin lispro 100 UNIT/ML injection  Commonly known as:  HUMALOG   Total of 50 units in a day      ONE TOUCH ULTRA TEST test strip  Generic drug:  glucose blood   OneTouch Ultra Blue STRP; Patient Sig: OneTouch Ultra Blue STRP TEST 4  TIMES A DAY; 2; 0; 04-Mar-2013; Active      onetouch ultrasoft lancets   Does not apply      vitamin D 1.25 MG (03712 UT) capsule capsule  Commonly known as:  ERGOCALCIFEROL   50,000 Units, Oral, 2 Times Weekly            Diet Instructions     Diet: Regular, Consistent Carbohydrate      Discharge Diet:   Regular  Consistent Carbohydrate            Activity Instructions     Activity as Tolerated           Additional Instructions for the Follow-ups that You Need to Schedule     Call MD for problems / concerns.   As  directed        Follow-up Information     Patricia Milner MD Follow up.    Specialty:  Family Medicine  Contact information:  [address]  [place] [address]  [facility] [id number] [phone]             Senthil Veloz MD. Schedule an appointment as soon as possible for a visit in 2 week(s).    Specialty:  General Surgery  Contact information:  [address]  [facility] [address]  [facility] [id number] [phone]                 Test Results Pending at Discharge   Order Current Status    Pregnancy, Urine - Urine, Clean Catch In process    Blood Culture - Blood, Arm, Left Preliminary result    Blood Culture - Blood, Arm, Left Preliminary result         Moris Zamudio MD  10/31/19  2:46 PM    Discharge time spent greater than 30 minutes.      Electronically signed by Moris Zamudio MD at 10/31/19 4015

## 2019-11-04 ENCOUNTER — READMISSION MANAGEMENT (OUTPATIENT)
Dept: CALL CENTER | Facility: HOSPITAL | Age: 29
End: 2019-11-04

## 2019-11-04 NOTE — PROGRESS NOTES
Case Management Discharge Note    Final Note: Home with no needs. Transported by family    Destination      No service has been selected for the patient.      Durable Medical Equipment      No service has been selected for the patient.      Dialysis/Infusion      No service has been selected for the patient.      Home Medical Care      No service has been selected for the patient.      Therapy      No service has been selected for the patient.      Community Resources      No service has been selected for the patient.        Transportation Services  Private: Car    Final Discharge Disposition Code: 01 - home or self-care

## 2019-11-04 NOTE — OUTREACH NOTE
Medical Week 1 Survey      Responses   Facility patient discharged from?  [facility]   Does the patient have one of the following disease processes/diagnoses(primary or secondary)?  Other   Is there a successful TCM telephone encounter documented?  No   Week 1 attempt successful?  No   Unsuccessful attempts  Attempt 1          Laurie Khan RN

## 2019-11-05 ENCOUNTER — READMISSION MANAGEMENT (OUTPATIENT)
Dept: CALL CENTER | Facility: HOSPITAL | Age: 29
End: 2019-11-05

## 2019-11-05 NOTE — OUTREACH NOTE
Medical Week 1 Survey      Responses   Facility patient discharged from?  [facility]   Does the patient have one of the following disease processes/diagnoses(primary or secondary)?  Other   Is there a successful TCM telephone encounter documented?  No   Week 1 attempt successful?  No   Unsuccessful attempts  Attempt 2          Santa Alvares RN

## 2019-11-07 ENCOUNTER — RESULTS ENCOUNTER (OUTPATIENT)
Dept: ENDOCRINOLOGY | Age: 29
End: 2019-11-07

## 2019-11-07 DIAGNOSIS — Z46.81 INSULIN PUMP TITRATION: ICD-10-CM

## 2019-11-07 DIAGNOSIS — E10.65 UNCONTROLLED TYPE 1 DIABETES MELLITUS WITH HYPERGLYCEMIA (HCC): ICD-10-CM

## 2019-11-07 DIAGNOSIS — E78.2 MIXED HYPERLIPIDEMIA: ICD-10-CM

## 2019-11-11 ENCOUNTER — READMISSION MANAGEMENT (OUTPATIENT)
Dept: CALL CENTER | Facility: HOSPITAL | Age: 29
End: 2019-11-11

## 2019-11-11 NOTE — OUTREACH NOTE
Medical Week 2 Survey      Responses   Facility patient discharged from?  [facility]   Does the patient have one of the following disease processes/diagnoses(primary or secondary)?  Other   Week 2 attempt successful?  No   Unsuccessful attempts  Attempt 1          Ryann Schmidt RN

## 2019-11-12 ENCOUNTER — READMISSION MANAGEMENT (OUTPATIENT)
Dept: CALL CENTER | Facility: HOSPITAL | Age: 29
End: 2019-11-12

## 2019-11-12 ENCOUNTER — OFFICE VISIT (OUTPATIENT)
Dept: SURGERY | Facility: CLINIC | Age: 29
End: 2019-11-12

## 2019-11-12 VITALS — HEART RATE: 82 BPM | WEIGHT: 147.6 LBS | HEIGHT: 62 IN | OXYGEN SATURATION: 99 % | BODY MASS INDEX: 27.16 KG/M2

## 2019-11-12 DIAGNOSIS — Z51.89 VISIT FOR WOUND CHECK: Primary | ICD-10-CM

## 2019-11-12 PROCEDURE — 99213 OFFICE O/P EST LOW 20 MIN: CPT | Performed by: SURGERY

## 2019-11-12 NOTE — PROGRESS NOTES
CC: Follow-up back skin abscess    HPI: Patient is a very pleasant 29-year-old female with diabetes that was admitted to the hospital last week with back abscess.  The patient had her abscess drained in the emergency room.  She was admitted for antibiotics.  She did great and was discharged home.  She really denies any problems since then, denies any drainage, erythema or pain    Past Medical History:   Diagnosis Date   • Diabetes mellitus (CMS/HCC)    • Postoperative wound infection 10/27/19     Past Surgical History:   Procedure Laterality Date   • INCISION AND DRAINAGE ABSCESS N/A 10/27/2019    Lumbar Back @ [facility] ED     Meds and allergies were reviewed and reconciled in epic    Review of systems  -Denies any fever, fatigue or chills  -Denies any back pain and muscle weakness    PE:   Vitals:    11/12/19 1547   Pulse: 82   SpO2: 99%   Alert and oriented x3, no acute distress  Neck supple  Over her lower back there are 2 scabs that are healing well.  There is no erythema or purulent drainage.  There is no pain over the area.    Assessment and plan    29-year-old female with diabetes that had lower back skin abscess status post drainage of the emergency room.  The wound is healing great and there is no signs of ongoing infection.  Discussed with the patient about the need to place antibiotic ointment over the wound twice a day until completely healed and leave the wound open to air.  She understood    -Follow-up in my office AMANDA Veloz MD, FACS  General, Minimally Invasive and Endoscopic Surgery  [facility]    [address], [address]  [place], [place], [address]  P: [phone]  F: [phone]

## 2019-11-12 NOTE — OUTREACH NOTE
Medical Week 2 Survey      Responses   Facility patient discharged from?  [facility]   Does the patient have one of the following disease processes/diagnoses(primary or secondary)?  Other   Week 2 attempt successful?  Yes   Call start time  1113   Discharge diagnosis  cellulitis,  small abscess,  I&D   Call end time  1114   Is patient permission given to speak with other caregiver?  No   Meds reviewed with patient/caregiver?  Yes   Is the patient taking all medications as directed (includes completed medication regime)?  Yes   Does the patient have a primary care provider?   Yes   Has the patient kept scheduled appointments due by today?  Yes   Comments  Appt today   Has home health visited the patient within 72 hours of discharge?  N/A   Psychosocial issues?  No   Did the patient receive a copy of their discharge instructions?  Yes   Nursing interventions  Reviewed instructions with patient   What is the patient's perception of their health status since discharge?  Improving   Is the patient/caregiver able to teach back signs and symptoms related to disease process for when to call PCP?  Yes   Is the patient/caregiver able to teach back signs and symptoms related to disease process for when to call 911?  Yes   Is the patient/caregiver able to teach back the hierarchy of who to call/visit for symptoms/problems? PCP, Specialist, Home health nurse, Urgent Care, ED, 911  Yes   Week 2 Call Completed?  Yes   Graduated  Yes   Did the patient feel the follow up calls were helpful during their recovery period?  Yes   Was the number of calls appropriate?  Yes   Graduated/Revoked comments  Goals met   Wrap up additional comments  Seeing doctor today. Redness has resolved, no pain. Much better according to patient.           Sindy Waller, RN

## 2019-11-15 ENCOUNTER — OFFICE VISIT (OUTPATIENT)
Dept: ENDOCRINOLOGY | Age: 29
End: 2019-11-15

## 2019-11-15 VITALS
BODY MASS INDEX: 27.6 KG/M2 | DIASTOLIC BLOOD PRESSURE: 82 MMHG | OXYGEN SATURATION: 99 % | HEART RATE: 96 BPM | WEIGHT: 150 LBS | HEIGHT: 62 IN | SYSTOLIC BLOOD PRESSURE: 124 MMHG

## 2019-11-15 DIAGNOSIS — E78.2 MIXED HYPERLIPIDEMIA: ICD-10-CM

## 2019-11-15 DIAGNOSIS — E10.65 UNCONTROLLED TYPE 1 DIABETES MELLITUS WITH HYPERGLYCEMIA (HCC): Primary | ICD-10-CM

## 2019-11-15 DIAGNOSIS — Z46.81 INSULIN PUMP TITRATION: ICD-10-CM

## 2019-11-15 PROCEDURE — 95251 CONT GLUC MNTR ANALYSIS I&R: CPT | Performed by: INTERNAL MEDICINE

## 2019-11-15 PROCEDURE — 99214 OFFICE O/P EST MOD 30 MIN: CPT | Performed by: INTERNAL MEDICINE

## 2019-11-15 NOTE — PROGRESS NOTES
29 y.o.    Patient Care Team:  Patricia Milner MD as PCP - General    Chief Complaint:    FOLLOW UP/ TYPE 1 DIABETES MELLITUS  Subjective     HPI     Kati Garza,29 y.o. WF is here as a follow up of Type 1 dm.   Patient was recently hospitalized with an abscess underwent incisional drainage and also was on antibiotics.    Type 1 diabetes mellitus-diagnosed she is on [employer] insulin pump 670 G along with the sensor.  Downloaded the insulin pump and the sensor.  It does not appear as if she is calibrating the sensor appropriately.  Average blood sugars are around 180-200s.  No significant low blood sugars but she has been running high in 200s-300s range for significant amount of time.  She is only in the auto mode for about 48% of the time.  Up-to-date with her eye examination, no history of diabetic retinopathy.  No known history of diabetic neuropathy per patient.  No ulcers or open wounds on her body.  No CAD, CKD,CVA per pt.   Pt is physically active. Weight has been stable.   Pt tries to follow DM diet for most part.  She reports being comfortable and carbohydrate count  Last DKA episode was more than 5 years.      Hyperlipidemia  Not on any medication.    Reviewed primary care physician's/consulting physician documentation and lab results :     Interval History      The following portions of the patient's history were reviewed and updated as appropriate: allergies, current medications, past family history, past medical history, past social history, past surgical history and problem list.    Past Medical History:   Diagnosis Date   • Diabetes mellitus (CMS/[facility])    • Postoperative wound infection 10/27/19     Family History   Problem Relation Age of Onset   • Diabetes Maternal Grandfather      Social History     Socioeconomic History   • Marital status: Single     Spouse name: Not on file   • Number of children: Not on file   • Years of education: Not on file   • Highest education level: Not on file  "  Tobacco Use   • Smoking status: Never Smoker   • Smokeless tobacco: Never Used   Substance and Sexual Activity   • Alcohol use: Yes     Types: 1 Shots of liquor per week     Frequency: Never     Comment: [place], maybe 2-3 times/month   • Drug use: No   • Sexual activity: No     Allergies   Allergen Reactions   • No Known Drug Allergy        Current Outpatient Medications:   •  atorvastatin (LIPITOR) 10 MG tablet, Take 1 tablet by mouth Daily., Disp: 30 tablet, Rfl: 11  •  glucose blood (ONE TOUCH ULTRA TEST) test strip, OneTouch Ultra Blue STRP; Patient Sig: OneTouch Ultra Blue STRP TEST 4  TIMES A DAY; 2; 0; 04-Mar-2013; Active, Disp: , Rfl:   •  insulin lispro (HUMALOG) 100 UNIT/ML injection, Total of 50 units in a day, Disp: 50 mL, Rfl: 3  •  Lancets (ONETOUCH ULTRASOFT) lancets, , Disp: , Rfl:   •  vitamin D (ERGOCALCIFEROL) 61406 units capsule capsule, Take 1 capsule by mouth 2 (Two) Times a Week., Disp: 30 capsule, Rfl: 11        Review of Systems   Constitutional: Negative for appetite change, fatigue and fever.   Eyes: Negative for visual disturbance.   Respiratory: Negative for shortness of breath.    Cardiovascular: Negative for palpitations and leg swelling.   Gastrointestinal: Negative for abdominal pain and vomiting.   Endocrine: Negative for polydipsia and polyuria.   Musculoskeletal: Negative for joint swelling and neck pain.   Skin: Negative for rash.   Neurological: Negative for weakness and numbness.   Psychiatric/Behavioral: Negative for behavioral problems.     I have reviewed the ROS as documented by the MA; David Zaragoza MD.      Objective       Vitals:    11/15/19 1411   BP: 124/82   Pulse: 96   SpO2: 99%   Weight: 68 kg (150 lb)   Height: 157.5 cm (62\")     Body mass index is 27.44 kg/m².      Physical Exam   Constitutional: She is oriented to person, place, and time. She appears well-nourished.   HENT:   Head: Normocephalic and atraumatic.   Eyes: Conjunctivae and EOM are normal. No " scleral icterus.   Neck: Normal range of motion. Neck supple. No thyromegaly present.   Cardiovascular: Normal rate and normal heart sounds. Exam reveals no friction rub.   No murmur heard.  Pulmonary/Chest: Effort normal and breath sounds normal. No stridor. She has no wheezes. She has no rales.   Abdominal: Soft. Bowel sounds are normal. She exhibits no distension. There is no tenderness.   Musculoskeletal: She exhibits no edema or tenderness.   Lymphadenopathy:     She has no cervical adenopathy.   Neurological: She is alert and oriented to person, place, and time.   Skin: Skin is warm and dry. She is not diaphoretic.   Psychiatric: She has a normal mood and affect.   Vitals reviewed.    Results Review:     I reviewed the patient's new clinical results and mentioned them above in HPI and in plan as well.    Medical records reviewed  Summary:Done      Admission on 10/27/2019, Discharged on 10/31/2019   Component Date Value Ref Range Status   • Extra Tube 10/27/2019 hold for add-on   Final    Auto resulted   • Extra Tube 10/27/2019 Hold for add-ons.   Final    Auto resulted.   • Extra Tube 10/27/2019 hold for add-on   Final    Auto resulted   • Extra Tube 10/27/2019 Hold for add-ons.   Final    Auto resulted.   • Glucose 10/27/2019 200* 65 - 99 mg/dL Final   • BUN 10/27/2019 10  6 - 20 mg/dL Final   • Creatinine 10/27/2019 0.90  0.57 - 1.00 mg/dL Final   • Sodium 10/27/2019 136  136 - 145 mmol/L Final   • Potassium 10/27/2019 3.8  3.5 - 5.2 mmol/L Final   • Chloride 10/27/2019 98  98 - 107 mmol/L Final   • CO2 10/27/2019 20.7* 22.0 - 29.0 mmol/L Final   • Calcium 10/27/2019 9.2  8.6 - 10.5 mg/dL Final   • Total Protein 10/27/2019 7.8  6.0 - 8.5 g/dL Final   • Albumin 10/27/2019 4.30  3.50 - 5.20 g/dL Final   • ALT (SGPT) 10/27/2019 8  1 - 33 U/L Final   • AST (SGOT) 10/27/2019 9  1 - 32 U/L Final   • Alkaline Phosphatase 10/27/2019 100  39 - 117 U/L Final   • Total Bilirubin 10/27/2019 0.3  0.2 - 1.2 mg/dL Final    • eGFR Non  Amer 10/27/2019 73  >60 mL/min/1.73 Final   • Globulin 10/27/2019 3.5  gm/dL Final   • A/G Ratio 10/27/2019 1.2  g/dL Final   • BUN/Creatinine Ratio 10/27/2019 11.1  7.0 - 25.0 Final   • Anion Gap 10/27/2019 17.3* 5.0 - 15.0 mmol/L Final   • HCG Qualitative 10/27/2019 Negative  Negative Final   • Procalcitonin 10/27/2019 0.16  0.10 - 0.25 ng/mL Final   • Lactate 10/27/2019 0.6  0.5 - 2.0 mmol/L Final   • Color, UA 10/27/2019 Yellow  Yellow, Straw Final   • Appearance, UA 10/27/2019 Clear  Clear Final   • pH, UA 10/27/2019 5.5  5.0 - 8.0 Final   • Specific Gravity, UA 10/27/2019 1.022  1.005 - 1.030 Final   • Glucose, UA 10/27/2019 >=1000 mg/dL (3+)* Negative Final   • Ketones, UA 10/27/2019 80 mg/dL (3+)* Negative Final   • Bilirubin, UA 10/27/2019 Negative  Negative Final   • Blood, UA 10/27/2019 Negative  Negative Final   • Protein, UA 10/27/2019 Trace* Negative Final   • Leuk Esterase, UA 10/27/2019 Negative  Negative Final   • Nitrite, UA 10/27/2019 Negative  Negative Final   • Urobilinogen, UA 10/27/2019 0.2 E.U./dL  0.2 - 1.0 E.U./dL Final   • Blood Culture 10/27/2019 No growth at 5 days   Final   • Blood Culture 10/27/2019 No growth at 5 days   Final   • Wound Culture 10/27/2019 Moderate growth (3+) Staphylococcus aureus*  Final   • Gram Stain 10/27/2019 No WBCs or organisms seen   Final   • WBC 10/27/2019 18.05* 3.40 - 10.80 10*3/mm3 Final   • RBC 10/27/2019 4.66  3.77 - 5.28 10*6/mm3 Final   • Hemoglobin 10/27/2019 12.5  12.0 - 15.9 g/dL Final   • Hematocrit 10/27/2019 37.6  34.0 - 46.6 % Final   • MCV 10/27/2019 80.7  79.0 - 97.0 fL Final   • MCH 10/27/2019 26.8  26.6 - 33.0 pg Final   • MCHC 10/27/2019 33.2  31.5 - 35.7 g/dL Final   • RDW 10/27/2019 14.0  12.3 - 15.4 % Final   • RDW-SD 10/27/2019 40.9  37.0 - 54.0 fl Final   • MPV 10/27/2019 11.9  6.0 - 12.0 fL Final   • Platelets 10/27/2019 437  140 - 450 10*3/mm3 Final   • Neutrophil % 10/27/2019 86.0* 42.7 - 76.0 % Final   •  Lymphocyte % 10/27/2019 6.9* 19.6 - 45.3 % Final   • Monocyte % 10/27/2019 5.9  5.0 - 12.0 % Final   • Eosinophil % 10/27/2019 0.1* 0.3 - 6.2 % Final   • Basophil % 10/27/2019 0.4  0.0 - 1.5 % Final   • Immature Grans % 10/27/2019 0.7* 0.0 - 0.5 % Final   • Neutrophils, Absolute 10/27/2019 15.52* 1.70 - 7.00 10*3/mm3 Final   • Lymphocytes, Absolute 10/27/2019 1.25  0.70 - 3.10 10*3/mm3 Final   • Monocytes, Absolute 10/27/2019 1.06* 0.10 - 0.90 10*3/mm3 Final   • Eosinophils, Absolute 10/27/2019 0.01  0.00 - 0.40 10*3/mm3 Final   • Basophils, Absolute 10/27/2019 0.08  0.00 - 0.20 10*3/mm3 Final   • Immature Grans, Absolute 10/27/2019 0.13* 0.00 - 0.05 10*3/mm3 Final   • nRBC 10/27/2019 0.1  0.0 - 0.2 /100 WBC Final   • Glucose 10/28/2019 116* 65 - 99 mg/dL Final   • BUN 10/28/2019 8  6 - 20 mg/dL Final   • Creatinine 10/28/2019 0.61  0.57 - 1.00 mg/dL Final   • Sodium 10/28/2019 138  136 - 145 mmol/L Final   • Potassium 10/28/2019 3.5  3.5 - 5.2 mmol/L Final   • Chloride 10/28/2019 105  98 - 107 mmol/L Final   • CO2 10/28/2019 18.2* 22.0 - 29.0 mmol/L Final   • Calcium 10/28/2019 8.1* 8.6 - 10.5 mg/dL Final   • eGFR Non African Amer 10/28/2019 114  >60 mL/min/1.73 Final   • BUN/Creatinine Ratio 10/28/2019 13.1  7.0 - 25.0 Final   • Anion Gap 10/28/2019 14.8  5.0 - 15.0 mmol/L Final   • WBC 10/28/2019 18.62* 3.40 - 10.80 10*3/mm3 Final   • RBC 10/28/2019 4.19  3.77 - 5.28 10*6/mm3 Final   • Hemoglobin 10/28/2019 10.9* 12.0 - 15.9 g/dL Final   • Hematocrit 10/28/2019 33.6* 34.0 - 46.6 % Final   • MCV 10/28/2019 80.2  79.0 - 97.0 fL Final   • MCH 10/28/2019 26.0* 26.6 - 33.0 pg Final   • MCHC 10/28/2019 32.4  31.5 - 35.7 g/dL Final   • RDW 10/28/2019 14.0  12.3 - 15.4 % Final   • RDW-SD 10/28/2019 40.1  37.0 - 54.0 fl Final   • MPV 10/28/2019 11.9  6.0 - 12.0 fL Final   • Platelets 10/28/2019 420  140 - 450 10*3/mm3 Final   • Hemoglobin A1C 10/28/2019 10.27* 4.80 - 5.60 % Final   • Glucose 10/28/2019 125  70 - 130 mg/dL  Final   • Glucose 10/28/2019 109  70 - 130 mg/dL Final   • Glucose 10/28/2019 200* 70 - 130 mg/dL Final   • Glucose 10/28/2019 146* 70 - 130 mg/dL Final   • Glucose 10/28/2019 187* 70 - 130 mg/dL Final   • WBC 10/29/2019 12.84* 3.40 - 10.80 10*3/mm3 Final   • RBC 10/29/2019 3.52* 3.77 - 5.28 10*6/mm3 Final   • Hemoglobin 10/29/2019 9.3* 12.0 - 15.9 g/dL Final   • Hematocrit 10/29/2019 28.6* 34.0 - 46.6 % Final   • MCV 10/29/2019 81.3  79.0 - 97.0 fL Final   • MCH 10/29/2019 26.4* 26.6 - 33.0 pg Final   • MCHC 10/29/2019 32.5  31.5 - 35.7 g/dL Final   • RDW 10/29/2019 14.2  12.3 - 15.4 % Final   • RDW-SD 10/29/2019 41.7  37.0 - 54.0 fl Final   • MPV 10/29/2019 10.9  6.0 - 12.0 fL Final   • Platelets 10/29/2019 326  140 - 450 10*3/mm3 Final   • Glucose 10/29/2019 148* 65 - 99 mg/dL Final   • BUN 10/29/2019 3* 6 - 20 mg/dL Final   • Creatinine 10/29/2019 0.63  0.57 - 1.00 mg/dL Final   • Sodium 10/29/2019 141  136 - 145 mmol/L Final   • Potassium 10/29/2019 3.2* 3.5 - 5.2 mmol/L Final   • Chloride 10/29/2019 109* 98 - 107 mmol/L Final   • CO2 10/29/2019 21.3* 22.0 - 29.0 mmol/L Final   • Calcium 10/29/2019 7.4* 8.6 - 10.5 mg/dL Final   • Albumin 10/29/2019 2.80* 3.50 - 5.20 g/dL Final   • Phosphorus 10/29/2019 2.3* 2.5 - 4.5 mg/dL Final   • Anion Gap 10/29/2019 10.7  5.0 - 15.0 mmol/L Final   • BUN/Creatinine Ratio 10/29/2019 4.8* 7.0 - 25.0 Final   • eGFR Non African Amer 10/29/2019 110  >60 mL/min/1.73 Final   • Glucose 10/29/2019 113  70 - 130 mg/dL Final   • Glucose 10/29/2019 136* 70 - 130 mg/dL Final   • Potassium 10/29/2019 3.6  3.5 - 5.2 mmol/L Final   • Glucose 10/29/2019 210* 70 - 130 mg/dL Final   • Glucose 10/29/2019 226* 70 - 130 mg/dL Final   • WBC 10/30/2019 9.44  3.40 - 10.80 10*3/mm3 Final   • RBC 10/30/2019 3.73* 3.77 - 5.28 10*6/mm3 Final   • Hemoglobin 10/30/2019 9.7* 12.0 - 15.9 g/dL Final   • Hematocrit 10/30/2019 30.1* 34.0 - 46.6 % Final   • MCV 10/30/2019 80.7  79.0 - 97.0 fL Final   • MCH  10/30/2019 26.0* 26.6 - 33.0 pg Final   • MCHC 10/30/2019 32.2  31.5 - 35.7 g/dL Final   • RDW 10/30/2019 14.2  12.3 - 15.4 % Final   • RDW-SD 10/30/2019 41.1  37.0 - 54.0 fl Final   • MPV 10/30/2019 11.3  6.0 - 12.0 fL Final   • Platelets 10/30/2019 401  140 - 450 10*3/mm3 Final   • Glucose 10/30/2019 125* 65 - 99 mg/dL Final   • BUN 10/30/2019 2* 6 - 20 mg/dL Final   • Creatinine 10/30/2019 0.56* 0.57 - 1.00 mg/dL Final   • Sodium 10/30/2019 143  136 - 145 mmol/L Final   • Potassium 10/30/2019 3.7  3.5 - 5.2 mmol/L Final   • Chloride 10/30/2019 112* 98 - 107 mmol/L Final   • CO2 10/30/2019 23.1  22.0 - 29.0 mmol/L Final   • Calcium 10/30/2019 8.0* 8.6 - 10.5 mg/dL Final   • eGFR Non African Amer 10/30/2019 126  >60 mL/min/1.73 Final   • BUN/Creatinine Ratio 10/30/2019 3.6* 7.0 - 25.0 Final   • Anion Gap 10/30/2019 7.9  5.0 - 15.0 mmol/L Final   • Phosphorus 10/30/2019 2.2* 2.5 - 4.5 mg/dL Final   • Vancomycin Trough 10/30/2019 11.80  5.00 - 20.00 mcg/mL Final   • Glucose 10/30/2019 97  70 - 130 mg/dL Final   • Glucose 10/30/2019 113  70 - 130 mg/dL Final   • Phosphorus 10/30/2019 2.5  2.5 - 4.5 mg/dL Final   • Glucose 10/30/2019 157* 70 - 130 mg/dL Final   • Glucose 10/30/2019 119  70 - 130 mg/dL Final   • Phosphorus 10/31/2019 3.8  2.5 - 4.5 mg/dL Final   • Glucose 10/31/2019 143* 70 - 130 mg/dL Final   • TSH 10/31/2019 2.600  0.270 - 4.200 uIU/mL Final   • Free T4 10/31/2019 1.54  0.93 - 1.70 ng/dL Final   • Total Cholesterol 10/31/2019 87  0 - 200 mg/dL Final   • Triglycerides 10/31/2019 82  0 - 150 mg/dL Final   • HDL Cholesterol 10/31/2019 24* 40 - 60 mg/dL Final   • LDL Cholesterol  10/31/2019 47  0 - 100 mg/dL Final   • VLDL Cholesterol 10/31/2019 16.4  5 - 40 mg/dL Final   • LDL/HDL Ratio 10/31/2019 1.94   Final   • Glucose 10/31/2019 157* 70 - 130 mg/dL Final   • HCG, Urine QL 10/31/2019 Negative  Negative Final     Lab Results   Component Value Date    HGBA1C 10.27 (H) 10/28/2019    HGBA1C 8.00 (H)  "07/26/2019    HGBA1C 10.60 (H) 04/23/2019     Lab Results   Component Value Date    MICROALBUR 9.3 07/26/2019    CREATININE 0.56 (L) 10/30/2019     Imaging Results (Most Recent)     None                Assessment and Plan:    Type 1 diabetes mellitus-uncontrolled  HbA1c is worse  Increased the manual mode by 20%  Emphasized the patient to check her blood sugars and to calibrate her sensor to be remain in the auto mode  Change the bolus settings by 2 to 3 units.    Downloaded the insulin pump and the sensor information and reviewed the data with the patient.    Hyperlipidemia  LDL is worse.  Would consider a cholesterol patient blood sugars are decently controlled.    Reviewed Lab results with the patient.             David Zaragoza MD  11/15/19    [facility] Dragon / transcription disclaimer:     \"Dictated utilizing Dragon dictation\".  "

## 2020-02-15 ENCOUNTER — RESULTS ENCOUNTER (OUTPATIENT)
Dept: ENDOCRINOLOGY | Age: 30
End: 2020-02-15

## 2020-02-15 DIAGNOSIS — E78.2 MIXED HYPERLIPIDEMIA: ICD-10-CM

## 2020-02-15 DIAGNOSIS — E10.65 UNCONTROLLED TYPE 1 DIABETES MELLITUS WITH HYPERGLYCEMIA (HCC): ICD-10-CM

## 2020-02-15 DIAGNOSIS — Z46.81 INSULIN PUMP TITRATION: ICD-10-CM

## 2020-04-10 ENCOUNTER — TELEMEDICINE (OUTPATIENT)
Dept: ENDOCRINOLOGY | Age: 30
End: 2020-04-10

## 2020-04-10 DIAGNOSIS — E10.65 UNCONTROLLED TYPE 1 DIABETES MELLITUS WITH HYPERGLYCEMIA (HCC): Primary | ICD-10-CM

## 2020-04-10 DIAGNOSIS — Z46.81 INSULIN PUMP TITRATION: ICD-10-CM

## 2020-04-10 PROCEDURE — 99214 OFFICE O/P EST MOD 30 MIN: CPT | Performed by: INTERNAL MEDICINE

## 2020-04-10 NOTE — PROGRESS NOTES
29 y.o.    Patient Care Team:  Patricia Milner MD as PCP - General    Chief Complaint:    /follow upType 1 diabetes mellitus  Subjective     HPI     Kati Garza,29 y.o. WF is here as a follow up of Type 1 dm.   This is a video visit during the covid pandemic.     Type 1 diabetes mellitus-diagnosed when she was 16 months old.  Today on medial patient reports that she is on [employer] insulin pump 670 G along with the sensor.  Unable to download the patient's insulin pump and sensor.  Average blood sugars for the last 30 days was 229 mg/dL range.  She has been checking 2 times a day.  She is not maintaining in the auto mode as she is supposed to.  Due for her eye examination.  No prior history of diabetic retinopathy.  No known history of diabetic neuropathy.  No CAD, CKD,CVA per pt.   Pt is physically active. Weight has been stable.   Pt tries to follow DM diet for most part.  She reports being comfortable and carbohydrate count  Last DKA episode was more than 5 years.      Hyperlipidemia  On Lipitor 10 mg oral daily    You have chosen to receive care through a video visit today. Do you consent to use a video visit for your medical care today? Yes      Reviewed primary care physician's/consulting physician documentation and lab results :     Interval History      The following portions of the patient's history were reviewed and updated as appropriate: allergies, current medications, past family history, past medical history, past social history, past surgical history and problem list.    Past Medical History:   Diagnosis Date   • Diabetes mellitus (CMS/HCC)    • Diabetes mellitus type I (CMS/HCC) Oct 1989   • Postoperative wound infection 10/27/19     Family History   Problem Relation Age of Onset   • Diabetes Maternal Grandfather      Social History     Socioeconomic History   • Marital status: Single     Spouse name: Not on file   • Number of children: Not on file   • Years of education: Not on file   •  Highest education level: Not on file   Tobacco Use   • Smoking status: Never Smoker   • Smokeless tobacco: Never Used   Substance and Sexual Activity   • Alcohol use: Yes     Alcohol/week: 0.5 standard drinks     Types: 1 Shots of liquor per week     Frequency: Never     Comment: [place], maybe 2-3 times/month   • Drug use: No   • Sexual activity: Never     Allergies   Allergen Reactions   • No Known Drug Allergy        Current Outpatient Medications:   •  atorvastatin (LIPITOR) 10 MG tablet, Take 1 tablet by mouth Daily., Disp: 30 tablet, Rfl: 11  •  glucose blood (ONE TOUCH ULTRA TEST) test strip, OneTouch Ultra Blue STRP; Patient Sig: OneTouch Ultra Blue STRP TEST 4  TIMES A DAY; 2; 0; 04-Mar-2013; Active, Disp: , Rfl:   •  insulin lispro (HUMALOG) 100 UNIT/ML injection, Total of 50 units in a day, Disp: 50 mL, Rfl: 3  •  Lancets (ONETOUCH ULTRASOFT) lancets, , Disp: , Rfl:   •  vitamin D (ERGOCALCIFEROL) 12921 units capsule capsule, Take 1 capsule by mouth 2 (Two) Times a Week., Disp: 30 capsule, Rfl: 11        Review of Systems   Constitutional: Negative for appetite change, fatigue and fever.   Eyes: Negative for visual disturbance.   Respiratory: Negative for shortness of breath.    Cardiovascular: Negative for palpitations and leg swelling.   Gastrointestinal: Negative for abdominal pain and vomiting.   Endocrine: Negative for polydipsia and polyuria.   Musculoskeletal: Negative for joint swelling and neck pain.   Skin: Negative for rash.   Neurological: Negative for weakness and numbness.   Psychiatric/Behavioral: Negative for behavioral problems.     I have reviewed the ROS as documented by the MA; David Zaragoza MD.      Objective       There were no vitals filed for this visit.  There is no height or weight on file to calculate BMI.      Physical Exam   General appearance - no distress  Eyes-PERRLA, anicteric sclera  Ear nose and throat-external ears and nose normal.  Noted midline  trachea.  Respiratory-normal chest on inspection.  No respiratory distress noted.  Musculoskeletal-no edema.  Hammer toes noted.  Skin-no rashes.  Neuro-alert and oriented x3              Results Review:     I reviewed the patient's new clinical results and mentioned them above in HPI and in plan as well.    Medical records reviewed  Summary:Done      Admission on 10/27/2019, Discharged on 10/31/2019   Component Date Value Ref Range Status   • Extra Tube 10/27/2019 hold for add-on   Final    Auto resulted   • Extra Tube 10/27/2019 Hold for add-ons.   Final    Auto resulted.   • Extra Tube 10/27/2019 hold for add-on   Final    Auto resulted   • Extra Tube 10/27/2019 Hold for add-ons.   Final    Auto resulted.   • Glucose 10/27/2019 200* 65 - 99 mg/dL Final   • BUN 10/27/2019 10  6 - 20 mg/dL Final   • Creatinine 10/27/2019 0.90  0.57 - 1.00 mg/dL Final   • Sodium 10/27/2019 136  136 - 145 mmol/L Final   • Potassium 10/27/2019 3.8  3.5 - 5.2 mmol/L Final   • Chloride 10/27/2019 98  98 - 107 mmol/L Final   • CO2 10/27/2019 20.7* 22.0 - 29.0 mmol/L Final   • Calcium 10/27/2019 9.2  8.6 - 10.5 mg/dL Final   • Total Protein 10/27/2019 7.8  6.0 - 8.5 g/dL Final   • Albumin 10/27/2019 4.30  3.50 - 5.20 g/dL Final   • ALT (SGPT) 10/27/2019 8  1 - 33 U/L Final   • AST (SGOT) 10/27/2019 9  1 - 32 U/L Final   • Alkaline Phosphatase 10/27/2019 100  39 - 117 U/L Final   • Total Bilirubin 10/27/2019 0.3  0.2 - 1.2 mg/dL Final   • eGFR Non African Amer 10/27/2019 73  >60 mL/min/1.73 Final   • Globulin 10/27/2019 3.5  gm/dL Final   • A/G Ratio 10/27/2019 1.2  g/dL Final   • BUN/Creatinine Ratio 10/27/2019 11.1  7.0 - 25.0 Final   • Anion Gap 10/27/2019 17.3* 5.0 - 15.0 mmol/L Final   • HCG Qualitative 10/27/2019 Negative  Negative Final   • Procalcitonin 10/27/2019 0.16  0.10 - 0.25 ng/mL Final   • Lactate 10/27/2019 0.6  0.5 - 2.0 mmol/L Final   • Color, UA 10/27/2019 Yellow  Yellow, Straw Final   • Appearance, UA 10/27/2019 Clear   Clear Final   • pH, UA 10/27/2019 5.5  5.0 - 8.0 Final   • Specific Gravity, UA 10/27/2019 1.022  1.005 - 1.030 Final   • Glucose, UA 10/27/2019 >=1000 mg/dL (3+)* Negative Final   • Ketones, UA 10/27/2019 80 mg/dL (3+)* Negative Final   • Bilirubin, UA 10/27/2019 Negative  Negative Final   • Blood, UA 10/27/2019 Negative  Negative Final   • Protein, UA 10/27/2019 Trace* Negative Final   • Leuk Esterase, UA 10/27/2019 Negative  Negative Final   • Nitrite, UA 10/27/2019 Negative  Negative Final   • Urobilinogen, UA 10/27/2019 0.2 E.U./dL  0.2 - 1.0 E.U./dL Final   • Blood Culture 10/27/2019 No growth at 5 days   Final   • Blood Culture 10/27/2019 No growth at 5 days   Final   • Wound Culture 10/27/2019 Moderate growth (3+) Staphylococcus aureus*  Final   • Gram Stain 10/27/2019 No WBCs or organisms seen   Final   • WBC 10/27/2019 18.05* 3.40 - 10.80 10*3/mm3 Final   • RBC 10/27/2019 4.66  3.77 - 5.28 10*6/mm3 Final   • Hemoglobin 10/27/2019 12.5  12.0 - 15.9 g/dL Final   • Hematocrit 10/27/2019 37.6  34.0 - 46.6 % Final   • MCV 10/27/2019 80.7  79.0 - 97.0 fL Final   • MCH 10/27/2019 26.8  26.6 - 33.0 pg Final   • MCHC 10/27/2019 33.2  31.5 - 35.7 g/dL Final   • RDW 10/27/2019 14.0  12.3 - 15.4 % Final   • RDW-SD 10/27/2019 40.9  37.0 - 54.0 fl Final   • MPV 10/27/2019 11.9  6.0 - 12.0 fL Final   • Platelets 10/27/2019 437  140 - 450 10*3/mm3 Final   • Neutrophil % 10/27/2019 86.0* 42.7 - 76.0 % Final   • Lymphocyte % 10/27/2019 6.9* 19.6 - 45.3 % Final   • Monocyte % 10/27/2019 5.9  5.0 - 12.0 % Final   • Eosinophil % 10/27/2019 0.1* 0.3 - 6.2 % Final   • Basophil % 10/27/2019 0.4  0.0 - 1.5 % Final   • Immature Grans % 10/27/2019 0.7* 0.0 - 0.5 % Final   • Neutrophils, Absolute 10/27/2019 15.52* 1.70 - 7.00 10*3/mm3 Final   • Lymphocytes, Absolute 10/27/2019 1.25  0.70 - 3.10 10*3/mm3 Final   • Monocytes, Absolute 10/27/2019 1.06* 0.10 - 0.90 10*3/mm3 Final   • Eosinophils, Absolute 10/27/2019 0.01  0.00 - 0.40  10*3/mm3 Final   • Basophils, Absolute 10/27/2019 0.08  0.00 - 0.20 10*3/mm3 Final   • Immature Grans, Absolute 10/27/2019 0.13* 0.00 - 0.05 10*3/mm3 Final   • nRBC 10/27/2019 0.1  0.0 - 0.2 /100 WBC Final   • Glucose 10/28/2019 116* 65 - 99 mg/dL Final   • BUN 10/28/2019 8  6 - 20 mg/dL Final   • Creatinine 10/28/2019 0.61  0.57 - 1.00 mg/dL Final   • Sodium 10/28/2019 138  136 - 145 mmol/L Final   • Potassium 10/28/2019 3.5  3.5 - 5.2 mmol/L Final   • Chloride 10/28/2019 105  98 - 107 mmol/L Final   • CO2 10/28/2019 18.2* 22.0 - 29.0 mmol/L Final   • Calcium 10/28/2019 8.1* 8.6 - 10.5 mg/dL Final   • eGFR Non African Amer 10/28/2019 114  >60 mL/min/1.73 Final   • BUN/Creatinine Ratio 10/28/2019 13.1  7.0 - 25.0 Final   • Anion Gap 10/28/2019 14.8  5.0 - 15.0 mmol/L Final   • WBC 10/28/2019 18.62* 3.40 - 10.80 10*3/mm3 Final   • RBC 10/28/2019 4.19  3.77 - 5.28 10*6/mm3 Final   • Hemoglobin 10/28/2019 10.9* 12.0 - 15.9 g/dL Final   • Hematocrit 10/28/2019 33.6* 34.0 - 46.6 % Final   • MCV 10/28/2019 80.2  79.0 - 97.0 fL Final   • MCH 10/28/2019 26.0* 26.6 - 33.0 pg Final   • MCHC 10/28/2019 32.4  31.5 - 35.7 g/dL Final   • RDW 10/28/2019 14.0  12.3 - 15.4 % Final   • RDW-SD 10/28/2019 40.1  37.0 - 54.0 fl Final   • MPV 10/28/2019 11.9  6.0 - 12.0 fL Final   • Platelets 10/28/2019 420  140 - 450 10*3/mm3 Final   • Hemoglobin A1C 10/28/2019 10.27* 4.80 - 5.60 % Final   • Glucose 10/28/2019 125  70 - 130 mg/dL Final   • Glucose 10/28/2019 109  70 - 130 mg/dL Final   • Glucose 10/28/2019 200* 70 - 130 mg/dL Final   • Glucose 10/28/2019 146* 70 - 130 mg/dL Final   • Glucose 10/28/2019 187* 70 - 130 mg/dL Final   • WBC 10/29/2019 12.84* 3.40 - 10.80 10*3/mm3 Final   • RBC 10/29/2019 3.52* 3.77 - 5.28 10*6/mm3 Final   • Hemoglobin 10/29/2019 9.3* 12.0 - 15.9 g/dL Final   • Hematocrit 10/29/2019 28.6* 34.0 - 46.6 % Final   • MCV 10/29/2019 81.3  79.0 - 97.0 fL Final   • MCH 10/29/2019 26.4* 26.6 - 33.0 pg Final   • MCHC  10/29/2019 32.5  31.5 - 35.7 g/dL Final   • RDW 10/29/2019 14.2  12.3 - 15.4 % Final   • RDW-SD 10/29/2019 41.7  37.0 - 54.0 fl Final   • MPV 10/29/2019 10.9  6.0 - 12.0 fL Final   • Platelets 10/29/2019 326  140 - 450 10*3/mm3 Final   • Glucose 10/29/2019 148* 65 - 99 mg/dL Final   • BUN 10/29/2019 3* 6 - 20 mg/dL Final   • Creatinine 10/29/2019 0.63  0.57 - 1.00 mg/dL Final   • Sodium 10/29/2019 141  136 - 145 mmol/L Final   • Potassium 10/29/2019 3.2* 3.5 - 5.2 mmol/L Final   • Chloride 10/29/2019 109* 98 - 107 mmol/L Final   • CO2 10/29/2019 21.3* 22.0 - 29.0 mmol/L Final   • Calcium 10/29/2019 7.4* 8.6 - 10.5 mg/dL Final   • Albumin 10/29/2019 2.80* 3.50 - 5.20 g/dL Final   • Phosphorus 10/29/2019 2.3* 2.5 - 4.5 mg/dL Final   • Anion Gap 10/29/2019 10.7  5.0 - 15.0 mmol/L Final   • BUN/Creatinine Ratio 10/29/2019 4.8* 7.0 - 25.0 Final   • eGFR Non African Amer 10/29/2019 110  >60 mL/min/1.73 Final   • Glucose 10/29/2019 113  70 - 130 mg/dL Final   • Glucose 10/29/2019 136* 70 - 130 mg/dL Final   • Potassium 10/29/2019 3.6  3.5 - 5.2 mmol/L Final   • Glucose 10/29/2019 210* 70 - 130 mg/dL Final   • Glucose 10/29/2019 226* 70 - 130 mg/dL Final   • WBC 10/30/2019 9.44  3.40 - 10.80 10*3/mm3 Final   • RBC 10/30/2019 3.73* 3.77 - 5.28 10*6/mm3 Final   • Hemoglobin 10/30/2019 9.7* 12.0 - 15.9 g/dL Final   • Hematocrit 10/30/2019 30.1* 34.0 - 46.6 % Final   • MCV 10/30/2019 80.7  79.0 - 97.0 fL Final   • MCH 10/30/2019 26.0* 26.6 - 33.0 pg Final   • MCHC 10/30/2019 32.2  31.5 - 35.7 g/dL Final   • RDW 10/30/2019 14.2  12.3 - 15.4 % Final   • RDW-SD 10/30/2019 41.1  37.0 - 54.0 fl Final   • MPV 10/30/2019 11.3  6.0 - 12.0 fL Final   • Platelets 10/30/2019 401  140 - 450 10*3/mm3 Final   • Glucose 10/30/2019 125* 65 - 99 mg/dL Final   • BUN 10/30/2019 2* 6 - 20 mg/dL Final   • Creatinine 10/30/2019 0.56* 0.57 - 1.00 mg/dL Final   • Sodium 10/30/2019 143  136 - 145 mmol/L Final   • Potassium 10/30/2019 3.7  3.5 - 5.2  mmol/L Final   • Chloride 10/30/2019 112* 98 - 107 mmol/L Final   • CO2 10/30/2019 23.1  22.0 - 29.0 mmol/L Final   • Calcium 10/30/2019 8.0* 8.6 - 10.5 mg/dL Final   • eGFR Non African Amer 10/30/2019 126  >60 mL/min/1.73 Final   • BUN/Creatinine Ratio 10/30/2019 3.6* 7.0 - 25.0 Final   • Anion Gap 10/30/2019 7.9  5.0 - 15.0 mmol/L Final   • Phosphorus 10/30/2019 2.2* 2.5 - 4.5 mg/dL Final   • Vancomycin Trough 10/30/2019 11.80  5.00 - 20.00 mcg/mL Final   • Glucose 10/30/2019 97  70 - 130 mg/dL Final   • Glucose 10/30/2019 113  70 - 130 mg/dL Final   • Phosphorus 10/30/2019 2.5  2.5 - 4.5 mg/dL Final   • Glucose 10/30/2019 157* 70 - 130 mg/dL Final   • Glucose 10/30/2019 119  70 - 130 mg/dL Final   • Phosphorus 10/31/2019 3.8  2.5 - 4.5 mg/dL Final   • Glucose 10/31/2019 143* 70 - 130 mg/dL Final   • TSH 10/31/2019 2.600  0.270 - 4.200 uIU/mL Final   • Free T4 10/31/2019 1.54  0.93 - 1.70 ng/dL Final   • Total Cholesterol 10/31/2019 87  0 - 200 mg/dL Final   • Triglycerides 10/31/2019 82  0 - 150 mg/dL Final   • HDL Cholesterol 10/31/2019 24* 40 - 60 mg/dL Final   • LDL Cholesterol  10/31/2019 47  0 - 100 mg/dL Final   • VLDL Cholesterol 10/31/2019 16.4  5 - 40 mg/dL Final   • LDL/HDL Ratio 10/31/2019 1.94   Final   • Glucose 10/31/2019 157* 70 - 130 mg/dL Final   • HCG, Urine QL 10/31/2019 Negative  Negative Final     Lab Results   Component Value Date    HGBA1C 10.27 (H) 10/28/2019    HGBA1C 8.00 (H) 07/26/2019    HGBA1C 10.60 (H) 04/23/2019     Lab Results   Component Value Date    MICROALBUR 9.3 07/26/2019    CREATININE 0.8 11/21/2019     Imaging Results (Most Recent)     None                Assessment and Plan:      Type 1 diabetes mellitus-uncontrolled  Blood sugars are high.  Continue the patient in auto mode  Change bolus settings by 0.5.  Contact the [employer] agent to download the insulin pump and the sensor data to make further changes to the insulin pump.    Hyperlipidemia  Continue diet  "control  Continue Lipitor 10 mg oral daily.    Reviewed Lab results with the patient.             David Zaragoza MD  04/10/20    [facility] Dragon / transcription disclaimer:     \"Dictated utilizing Dragon dictation\".    13   ( greater than 50% of the time) out of  25 minutes  spent counseling the patient on pump changes.  Importance of diabetes control.    "

## 2020-05-19 RX ORDER — ERGOCALCIFEROL 1.25 MG/1
CAPSULE ORAL
Qty: 30 CAPSULE | Refills: 3 | Status: SHIPPED | OUTPATIENT
Start: 2020-05-19 | End: 2022-09-09 | Stop reason: SDUPTHER

## 2020-05-29 ENCOUNTER — RESULTS ENCOUNTER (OUTPATIENT)
Dept: ENDOCRINOLOGY | Age: 30
End: 2020-05-29

## 2020-05-29 DIAGNOSIS — Z46.81 INSULIN PUMP TITRATION: ICD-10-CM

## 2020-05-29 DIAGNOSIS — E10.65 UNCONTROLLED TYPE 1 DIABETES MELLITUS WITH HYPERGLYCEMIA (HCC): ICD-10-CM

## 2020-07-09 ENCOUNTER — RESULTS ENCOUNTER (OUTPATIENT)
Dept: ENDOCRINOLOGY | Age: 30
End: 2020-07-09

## 2020-07-09 DIAGNOSIS — Z46.81 INSULIN PUMP TITRATION: ICD-10-CM

## 2020-07-09 DIAGNOSIS — E10.65 UNCONTROLLED TYPE 1 DIABETES MELLITUS WITH HYPERGLYCEMIA (HCC): ICD-10-CM

## 2021-04-16 ENCOUNTER — BULK ORDERING (OUTPATIENT)
Dept: CASE MANAGEMENT | Facility: OTHER | Age: 31
End: 2021-04-16

## 2021-04-16 DIAGNOSIS — Z23 IMMUNIZATION DUE: ICD-10-CM

## 2022-09-09 ENCOUNTER — TELEMEDICINE (OUTPATIENT)
Dept: ENDOCRINOLOGY | Age: 32
End: 2022-09-09

## 2022-09-09 DIAGNOSIS — Z79.4 LONG-TERM INSULIN USE: Primary | ICD-10-CM

## 2022-09-09 DIAGNOSIS — E10.65 TYPE 1 DIABETES MELLITUS WITH HYPERGLYCEMIA: ICD-10-CM

## 2022-09-09 PROCEDURE — 99214 OFFICE O/P EST MOD 30 MIN: CPT | Performed by: INTERNAL MEDICINE

## 2022-09-09 RX ORDER — LANCETS
EACH MISCELLANEOUS
Qty: 100 EACH | Refills: 2 | Status: SHIPPED | OUTPATIENT
Start: 2022-09-09

## 2022-09-09 RX ORDER — ERGOCALCIFEROL 1.25 MG/1
50000 CAPSULE ORAL 2 TIMES WEEKLY
Qty: 30 CAPSULE | Refills: 3 | Status: SHIPPED | OUTPATIENT
Start: 2022-09-12

## 2022-09-09 NOTE — PROGRESS NOTES
Chief Complaint  Type 1 dm    Subjective          History of Present Illness    Kati Garza 32 y.o. presents with Type 1 dm as a follow-up patient.  Patient was last seen by me in April 2020, after that she failed to follow-up with me due to the pandemic    Type 1 dm - Diagnosed when she was 16 months old   today in clinic pt reports being on Medtronic insulin pump-670 G, has not been using the sensor.  Reports that the sensor is expensive and also it has been alerting her way too much and that has been kind of cumbersome for her to manage.  She uses the sensor as needed when she is not feeling well or sick  Average blood aioica-[phone]  Checks BG -2-3 times  Insulin pump -yes  Sensor -no  Dm retinopathy -no,Last eye exam -due for exam  Dm nephropathy -no  Dm neuropathy -no,Dm neuropathy meds -   CAD -no  CVA -now  Episodes of hypoglycemia -none in the last few weeks  Pt is physically active. weight has been stable.   Pt tries to follow DM diet for most part.   On Ace inb.  Prior DKA episodes -more than 5 years ago      Reviewed primary care physician's/consulting physician documentation and lab results     You have chosen to receive care through a telehealth visit.  Do you consent to use a video/audio connection for your medical care today? Yes  This is a epic visit      I have reviewed the patient's allergies, medicines, past medical hx, family hx and social hx in detail.    Objective   Vital Signs:   There were no vitals taken for this visit.  Physical Exam   General appearance - no distress  Eyes- anicteric sclera  Ear nose and throat-external ears and nose normal.    Respiratory-normal chest on inspection.  No respiratory distress noted.  Skin-no rashes.  Neuro-alert and oriented x3          Result Review :   The following data was reviewed by: David Zaragoza MD on 09/09/2022:  Admission on 10/27/2019, Discharged on 10/31/2019   Component Date Value Ref Range Status   • Extra Tube 10/27/2019 hold for add-on    Final    Auto resulted   • Extra Tube 10/27/2019 Hold for add-ons.   Final    Auto resulted.   • Extra Tube 10/27/2019 hold for add-on   Final    Auto resulted   • Extra Tube 10/27/2019 Hold for add-ons.   Final    Auto resulted.   • Glucose 10/27/2019 200 (A) 65 - 99 mg/dL Final   • BUN 10/27/2019 10  6 - 20 mg/dL Final   • Creatinine 10/27/2019 0.90  0.57 - 1.00 mg/dL Final   • Sodium 10/27/2019 136  136 - 145 mmol/L Final   • Potassium 10/27/2019 3.8  3.5 - 5.2 mmol/L Final   • Chloride 10/27/2019 98  98 - 107 mmol/L Final   • CO2 10/27/2019 20.7 (A) 22.0 - 29.0 mmol/L Final   • Calcium 10/27/2019 9.2  8.6 - 10.5 mg/dL Final   • Total Protein 10/27/2019 7.8  6.0 - 8.5 g/dL Final   • Albumin 10/27/2019 4.30  3.50 - 5.20 g/dL Final   • ALT (SGPT) 10/27/2019 8  1 - 33 U/L Final   • AST (SGOT) 10/27/2019 9  1 - 32 U/L Final   • Alkaline Phosphatase 10/27/2019 100  39 - 117 U/L Final   • Total Bilirubin 10/27/2019 0.3  0.2 - 1.2 mg/dL Final   • eGFR Non African Amer 10/27/2019 73  >60 mL/min/1.73 Final   • Globulin 10/27/2019 3.5  gm/dL Final   • A/G Ratio 10/27/2019 1.2  g/dL Final   • BUN/Creatinine Ratio 10/27/2019 11.1  7.0 - 25.0 Final   • Anion Gap 10/27/2019 17.3 (A) 5.0 - 15.0 mmol/L Final   • HCG Qualitative 10/27/2019 Negative  Negative Final   • Procalcitonin 10/27/2019 0.16  0.10 - 0.25 ng/mL Final   • Lactate 10/27/2019 0.6  0.5 - 2.0 mmol/L Final   • Color, UA 10/27/2019 Yellow  Yellow, Straw Final   • Appearance, UA 10/27/2019 Clear  Clear Final   • pH, UA 10/27/2019 5.5  5.0 - 8.0 Final   • Specific Gravity, UA 10/27/2019 1.022  1.005 - 1.030 Final   • Glucose, UA 10/27/2019 >=1000 mg/dL (3+) (A) Negative Final   • Ketones, UA 10/27/2019 80 mg/dL (3+) (A) Negative Final   • Bilirubin, UA 10/27/2019 Negative  Negative Final   • Blood, UA 10/27/2019 Negative  Negative Final   • Protein, UA 10/27/2019 Trace (A) Negative Final   • Leuk Esterase, UA 10/27/2019 Negative  Negative Final   • Nitrite, UA  10/27/2019 Negative  Negative Final   • Urobilinogen, UA 10/27/2019 0.2 E.U./dL  0.2 - 1.0 E.U./dL Final   • Blood Culture 10/27/2019 No growth at 5 days   Final   • Blood Culture 10/27/2019 No growth at 5 days   Final   • Wound Culture 10/27/2019 Moderate growth (3+) Staphylococcus aureus (A)  Final   • Gram Stain 10/27/2019 No WBCs or organisms seen   Final   • WBC 10/27/2019 18.05 (A) 3.40 - 10.80 10*3/mm3 Final   • RBC 10/27/2019 4.66  3.77 - 5.28 10*6/mm3 Final   • Hemoglobin 10/27/2019 12.5  12.0 - 15.9 g/dL Final   • Hematocrit 10/27/2019 37.6  34.0 - 46.6 % Final   • MCV 10/27/2019 80.7  79.0 - 97.0 fL Final   • MCH 10/27/2019 26.8  26.6 - 33.0 pg Final   • MCHC 10/27/2019 33.2  31.5 - 35.7 g/dL Final   • RDW 10/27/2019 14.0  12.3 - 15.4 % Final   • RDW-SD 10/27/2019 40.9  37.0 - 54.0 fl Final   • MPV 10/27/2019 11.9  6.0 - 12.0 fL Final   • Platelets 10/27/2019 437  140 - 450 10*3/mm3 Final   • Neutrophil % 10/27/2019 86.0 (A) 42.7 - 76.0 % Final   • Lymphocyte % 10/27/2019 6.9 (A) 19.6 - 45.3 % Final   • Monocyte % 10/27/2019 5.9  5.0 - 12.0 % Final   • Eosinophil % 10/27/2019 0.1 (A) 0.3 - 6.2 % Final   • Basophil % 10/27/2019 0.4  0.0 - 1.5 % Final   • Immature Grans % 10/27/2019 0.7 (A) 0.0 - 0.5 % Final   • Neutrophils, Absolute 10/27/2019 15.52 (A) 1.70 - 7.00 10*3/mm3 Final   • Lymphocytes, Absolute 10/27/2019 1.25  0.70 - 3.10 10*3/mm3 Final   • Monocytes, Absolute 10/27/2019 1.06 (A) 0.10 - 0.90 10*3/mm3 Final   • Eosinophils, Absolute 10/27/2019 0.01  0.00 - 0.40 10*3/mm3 Final   • Basophils, Absolute 10/27/2019 0.08  0.00 - 0.20 10*3/mm3 Final   • Immature Grans, Absolute 10/27/2019 0.13 (A) 0.00 - 0.05 10*3/mm3 Final   • nRBC 10/27/2019 0.1  0.0 - 0.2 /100 WBC Final   • Glucose 10/28/2019 116 (A) 65 - 99 mg/dL Final   • BUN 10/28/2019 8  6 - 20 mg/dL Final   • Creatinine 10/28/2019 0.61  0.57 - 1.00 mg/dL Final   • Sodium 10/28/2019 138  136 - 145 mmol/L Final   • Potassium 10/28/2019 3.5  3.5 -  5.2 mmol/L Final   • Chloride 10/28/2019 105  98 - 107 mmol/L Final   • CO2 10/28/2019 18.2 (A) 22.0 - 29.0 mmol/L Final   • Calcium 10/28/2019 8.1 (A) 8.6 - 10.5 mg/dL Final   • eGFR Non African Amer 10/28/2019 114  >60 mL/min/1.73 Final   • BUN/Creatinine Ratio 10/28/2019 13.1  7.0 - 25.0 Final   • Anion Gap 10/28/2019 14.8  5.0 - 15.0 mmol/L Final   • WBC 10/28/2019 18.62 (A) 3.40 - 10.80 10*3/mm3 Final   • RBC 10/28/2019 4.19  3.77 - 5.28 10*6/mm3 Final   • Hemoglobin 10/28/2019 10.9 (A) 12.0 - 15.9 g/dL Final   • Hematocrit 10/28/2019 33.6 (A) 34.0 - 46.6 % Final   • MCV 10/28/2019 80.2  79.0 - 97.0 fL Final   • MCH 10/28/2019 26.0 (A) 26.6 - 33.0 pg Final   • MCHC 10/28/2019 32.4  31.5 - 35.7 g/dL Final   • RDW 10/28/2019 14.0  12.3 - 15.4 % Final   • RDW-SD 10/28/2019 40.1  37.0 - 54.0 fl Final   • MPV 10/28/2019 11.9  6.0 - 12.0 fL Final   • Platelets 10/28/2019 420  140 - 450 10*3/mm3 Final   • Hemoglobin A1C 10/28/2019 10.27 (A) 4.80 - 5.60 % Final   • Glucose 10/28/2019 125  70 - 130 mg/dL Final   • Glucose 10/28/2019 109  70 - 130 mg/dL Final   • Glucose 10/28/2019 200 (A) 70 - 130 mg/dL Final   • Glucose 10/28/2019 146 (A) 70 - 130 mg/dL Final   • Glucose 10/28/2019 187 (A) 70 - 130 mg/dL Final   • WBC 10/29/2019 12.84 (A) 3.40 - 10.80 10*3/mm3 Final   • RBC 10/29/2019 3.52 (A) 3.77 - 5.28 10*6/mm3 Final   • Hemoglobin 10/29/2019 9.3 (A) 12.0 - 15.9 g/dL Final   • Hematocrit 10/29/2019 28.6 (A) 34.0 - 46.6 % Final   • MCV 10/29/2019 81.3  79.0 - 97.0 fL Final   • MCH 10/29/2019 26.4 (A) 26.6 - 33.0 pg Final   • MCHC 10/29/2019 32.5  31.5 - 35.7 g/dL Final   • RDW 10/29/2019 14.2  12.3 - 15.4 % Final   • RDW-SD 10/29/2019 41.7  37.0 - 54.0 fl Final   • MPV 10/29/2019 10.9  6.0 - 12.0 fL Final   • Platelets 10/29/2019 326  140 - 450 10*3/mm3 Final   • Glucose 10/29/2019 148 (A) 65 - 99 mg/dL Final   • BUN 10/29/2019 3 (A) 6 - 20 mg/dL Final   • Creatinine 10/29/2019 0.63  0.57 - 1.00 mg/dL Final   •  Sodium 10/29/2019 141  136 - 145 mmol/L Final   • Potassium 10/29/2019 3.2 (A) 3.5 - 5.2 mmol/L Final   • Chloride 10/29/2019 109 (A) 98 - 107 mmol/L Final   • CO2 10/29/2019 21.3 (A) 22.0 - 29.0 mmol/L Final   • Calcium 10/29/2019 7.4 (A) 8.6 - 10.5 mg/dL Final   • Albumin 10/29/2019 2.80 (A) 3.50 - 5.20 g/dL Final   • Phosphorus 10/29/2019 2.3 (A) 2.5 - 4.5 mg/dL Final   • Anion Gap 10/29/2019 10.7  5.0 - 15.0 mmol/L Final   • BUN/Creatinine Ratio 10/29/2019 4.8 (A) 7.0 - 25.0 Final   • eGFR Non African Amer 10/29/2019 110  >60 mL/min/1.73 Final   • Glucose 10/29/2019 113  70 - 130 mg/dL Final   • Glucose 10/29/2019 136 (A) 70 - 130 mg/dL Final   • Potassium 10/29/2019 3.6  3.5 - 5.2 mmol/L Final   • Glucose 10/29/2019 210 (A) 70 - 130 mg/dL Final   • Glucose 10/29/2019 226 (A) 70 - 130 mg/dL Final   • WBC 10/30/2019 9.44  3.40 - 10.80 10*3/mm3 Final   • RBC 10/30/2019 3.73 (A) 3.77 - 5.28 10*6/mm3 Final   • Hemoglobin 10/30/2019 9.7 (A) 12.0 - 15.9 g/dL Final   • Hematocrit 10/30/2019 30.1 (A) 34.0 - 46.6 % Final   • MCV 10/30/2019 80.7  79.0 - 97.0 fL Final   • MCH 10/30/2019 26.0 (A) 26.6 - 33.0 pg Final   • MCHC 10/30/2019 32.2  31.5 - 35.7 g/dL Final   • RDW 10/30/2019 14.2  12.3 - 15.4 % Final   • RDW-SD 10/30/2019 41.1  37.0 - 54.0 fl Final   • MPV 10/30/2019 11.3  6.0 - 12.0 fL Final   • Platelets 10/30/2019 401  140 - 450 10*3/mm3 Final   • Glucose 10/30/2019 125 (A) 65 - 99 mg/dL Final   • BUN 10/30/2019 2 (A) 6 - 20 mg/dL Final   • Creatinine 10/30/2019 0.56 (A) 0.57 - 1.00 mg/dL Final   • Sodium 10/30/2019 143  136 - 145 mmol/L Final   • Potassium 10/30/2019 3.7  3.5 - 5.2 mmol/L Final   • Chloride 10/30/2019 112 (A) 98 - 107 mmol/L Final   • CO2 10/30/2019 23.1  22.0 - 29.0 mmol/L Final   • Calcium 10/30/2019 8.0 (A) 8.6 - 10.5 mg/dL Final   • eGFR Non African Amer 10/30/2019 126  >60 mL/min/1.73 Final   • BUN/Creatinine Ratio 10/30/2019 3.6 (A) 7.0 - 25.0 Final   • Anion Gap 10/30/2019 7.9  5.0 -  15.0 mmol/L Final   • Phosphorus 10/30/2019 2.2 (A) 2.5 - 4.5 mg/dL Final   • Vancomycin Trough 10/30/2019 11.80  5.00 - 20.00 mcg/mL Final   • Glucose 10/30/2019 97  70 - 130 mg/dL Final   • Glucose 10/30/2019 113  70 - 130 mg/dL Final   • Phosphorus 10/30/2019 2.5  2.5 - 4.5 mg/dL Final   • Glucose 10/30/2019 157 (A) 70 - 130 mg/dL Final   • Glucose 10/30/2019 119  70 - 130 mg/dL Final   • Phosphorus 10/31/2019 3.8  2.5 - 4.5 mg/dL Final   • Glucose 10/31/2019 143 (A) 70 - 130 mg/dL Final   • TSH 10/31/2019 2.600  0.270 - 4.200 uIU/mL Final   • Free T4 10/31/2019 1.54  0.93 - 1.70 ng/dL Final   • Total Cholesterol 10/31/2019 87  0 - 200 mg/dL Final   • Triglycerides 10/31/2019 82  0 - 150 mg/dL Final   • HDL Cholesterol 10/31/2019 24 (A) 40 - 60 mg/dL Final   • LDL Cholesterol  10/31/2019 47  0 - 100 mg/dL Final   • VLDL Cholesterol 10/31/2019 16.4  5 - 40 mg/dL Final   • LDL/HDL Ratio 10/31/2019 1.94   Final   • Glucose 10/31/2019 157 (A) 70 - 130 mg/dL Final   • HCG, Urine QL 10/31/2019 Negative  Negative Final     Data reviewed: Prior endocrine notes       Results Review:    I reviewed the patient's new clinical results.     Assessment and Plan    Problem List Items Addressed This Visit        Other    DM (diabetes mellitus), type 1 (HCC)    Overview     Description: History of pontine chronic poor control and poor habits regarding testing, bolus dosing and medical follow-up.         Relevant Medications    insulin lispro (HumaLOG) 100 UNIT/ML injection    Other Relevant Orders    TSH    T4, Free    Basic Metabolic Panel    Hemoglobin A1c    Vitamin B12 & Folate    Vitamin D 25 Hydroxy      Other Visit Diagnoses     Long-term insulin use (HCC)    -  Primary        Type 1 diabetes mellitus-uncontrolled with hyperglycemia  Check HbA1c  Adjust the pump settings based on the blood work-up    Patient is instructed to handover her pump to one of the medical assistants for us to download her pump settings as it has  "been more than 2 years since the last time I have seen the patient.   I would be needing her updated insulin pump settings to make better adjustments.    Check thyroid panel, lipid panel.    Interpreted the blood work-up/imaging results performed by the primary care/consulting physician -    Refills sent to pharmacy    Follow Up     Patient was given instructions and counseling regarding her condition or for health maintenance advice. Please see specific information pulled into the AVS if appropriate.       Thank you for asking me to see your patient, Kati Garza in consultation.         David Zaragoza MD  09/09/22      EMR Dragon / transcription disclaimer:     \"Dictated utilizing Dragon dictation\".               "

## 2022-12-19 ENCOUNTER — TELEPHONE (OUTPATIENT)
Dept: ENDOCRINOLOGY | Age: 32
End: 2022-12-19

## 2022-12-19 DIAGNOSIS — E10.65 TYPE 1 DIABETES MELLITUS WITH HYPERGLYCEMIA: Primary | ICD-10-CM

## 2022-12-19 DIAGNOSIS — Z79.4 LONG-TERM INSULIN USE: ICD-10-CM

## 2022-12-19 NOTE — TELEPHONE ENCOUNTER
Patient is being seen for labs on 01/06/23 but there are no orders put in. Can we put them in please?

## 2023-01-17 ENCOUNTER — OFFICE VISIT (OUTPATIENT)
Dept: ENDOCRINOLOGY | Age: 33
End: 2023-01-17
Payer: COMMERCIAL

## 2023-01-17 VITALS
TEMPERATURE: 96.6 F | SYSTOLIC BLOOD PRESSURE: 120 MMHG | WEIGHT: 145 LBS | HEIGHT: 62 IN | DIASTOLIC BLOOD PRESSURE: 80 MMHG | HEART RATE: 117 BPM | BODY MASS INDEX: 26.68 KG/M2 | OXYGEN SATURATION: 99 %

## 2023-01-17 DIAGNOSIS — E10.65 TYPE 1 DIABETES MELLITUS WITH HYPERGLYCEMIA: Primary | ICD-10-CM

## 2023-01-17 PROCEDURE — 99214 OFFICE O/P EST MOD 30 MIN: CPT | Performed by: NURSE PRACTITIONER

## 2023-01-17 RX ORDER — GLUCAGON 3 MG/1
3 POWDER NASAL AS NEEDED
Qty: 1 EACH | Refills: 1 | Status: SHIPPED | OUTPATIENT
Start: 2023-01-17

## 2023-01-17 NOTE — PROGRESS NOTES
"Chief Complaint  Diabetes (Type 1 did not bring meter/Checks bs not often /daily/Up to date on eye exam /No hx of diabetic retinopathy or neuropathy)    Subjective        Kati Garza presents to [facility] ENDOCRINOLOGY  History of Present Illness     Lab Results   Component Value Date    HGBA1C 11.00 (H) 2023     Patient is having burnout from long-term management of her diabetes  This has resulted in worsening glycemic control  Only testing her blood sugar 1 time a day  The sensor is too much work and she does not find value in its use     Type 1 dm    Diagnosed when she was 16 months old   today in clinic pt reports being on Medtronic insulin pump-670 G  Pump review attached  Dm retinopathy -denies  Dm nephropathy -denies  Dm neuropathy -denies  CAD -denies  CVA -denies  Episodes of hypoglycemia -none reported  Backup plan: None  Glucagon: Yes, unsure if     Objective   Vital Signs:  /80   Pulse 117   Temp 96.6 °F (35.9 °C) (Temporal)   Ht 157.5 cm (62\")   Wt 65.8 kg (145 lb)   SpO2 99%   BMI 26.52 kg/m²   Estimated body mass index is 26.52 kg/m² as calculated from the following:    Height as of this encounter: 157.5 cm (62\").    Weight as of this encounter: 65.8 kg (145 lb).             Physical Exam  Vitals reviewed.   Constitutional:       General: She is not in acute distress.  HENT:      Head: Normocephalic and atraumatic.   Eyes:      General:         Right eye: No discharge.         Left eye: No discharge.   Pulmonary:      Effort: Pulmonary effort is normal. No respiratory distress.   Musculoskeletal:         General: No signs of injury. Normal range of motion.      Cervical back: Normal range of motion and neck supple.   Skin:     General: Skin is warm and dry.   Neurological:      Mental Status: She is alert and oriented to person, place, and time. Mental status is at baseline.   Psychiatric:         Mood and Affect: Mood normal.         Behavior: Behavior " normal.         Thought Content: Thought content normal.         Judgment: Judgment normal.        Result Review :  The following data was reviewed by: BENEDICT Jimenez on 01/17/2023:  Common labs    Common Labs 9/19/22 9/19/22 1/6/23 1/6/23 1/6/23    1551 1551 1604 1604 1604   Glucose 300 (A)  260 (A)     BUN 11  11     Creatinine 0.77  0.73     Sodium 138  138     Potassium 4.1  4.1     Chloride 98  101     Calcium 9.8  9.6     Total Cholesterol     158   Triglycerides     60   HDL Cholesterol     52   LDL Cholesterol      94   Hemoglobin A1C  10.90 (A)  11.00 (A)    (A) Abnormal value       Comments are available for some flowsheets but are not being displayed.                        Assessment and Plan   Diagnoses and all orders for this visit:    1. Type 1 diabetes mellitus with hyperglycemia (HCC) (Primary)  -     Ambulatory Referral to Psychiatry    Other orders  -     Insulin Syringes, Disposable, U-100 0.3 ML misc; For use if pump fails  Dispense: 50 each; Refill: 2  -     Glucagon (Baqsimi Two Pack) 3 MG/DOSE powder; 3 mg into the nostril(s) as directed by provider As Needed (hypoglycemia).  Dispense: 1 each; Refill: 1  -     insulin lispro (HumaLOG) 100 UNIT/ML injection; Total of 50 units in a day  Dispense: 50 mL; Refill: 0             Follow Up   Return in about 3 months (around 4/17/2023).     Gave patient a sample of Dexcom and sarai sensors to consider  A1c places patient at higher risk of diabetic complications, patient verbalizes understanding and is trying to find the motivation to improve her glycemic control  Insulin syringes sent in for backup plan in case of pump failure  Glucagon Emergency Kit refilled  Psych referral for evaluation of ADD per patient request  Patient may have to find another endocrinologist as she has [employer] insurance at this time  Patient does not currently have PCP  Patient is not involved with any diabetic support groups at this time, would consider given her disease  burnout  Discussed other options of pump therapy including OmniPod 5 and tandem control IQ  Discussed options of using manual injections  Patient verbalizes that she is going to improve her blood glucose monitoring as well as bolusing around her mealtimes to decrease the risk of complications related to hyperglycemia      Patient was given instructions and counseling regarding her condition or for health maintenance advice. Please see specific information pulled into the AVS if appropriate.     BENEDICT Jimenez

## 2023-08-15 RX ORDER — INSULIN LISPRO 100 [IU]/ML
INJECTION, SOLUTION INTRAVENOUS; SUBCUTANEOUS
Qty: 50 ML | Refills: 3 | OUTPATIENT
Start: 2023-08-15

## 2024-03-14 ENCOUNTER — TELEPHONE (OUTPATIENT)
Dept: ENDOCRINOLOGY | Age: 34
End: 2024-03-14

## 2024-03-14 NOTE — TELEPHONE ENCOUNTER
Caller: Kati Garza ANDRADE    Relationship: Self    Best call back number: [phone]    Requested Prescriptions:   HUMALOG     Pharmacy where request should be sent:  [facility] - [phone]    Last office visit with prescribing clinician: 1/17/2023   Last telemedicine visit with prescribing clinician: Visit date not found   Next office visit with prescribing clinician: 5/1/2024     Additional details provided by patient: PT HAS AN APPT ON THE BOOKS FOR MAY 1ST 2024, BUT WILL NEED A REFILL ON HUMALOG BEFORE THEN. STATES TWO VIALS SHOULD GET HER THROUGH IF A TEMP RX NEEDS TO BE WRITTEN. PLEASE CALL PT TO ADV. PT IS DOWN TO 1 UNOPENED VIAL.    Does the patient have less than a 3 day supply:  [] Yes  [x] No    Would you like a call back once the refill request has been completed: [x] Yes [] No    If the office needs to give you a call back, can they leave a voicemail: [x] Yes [] No    Adriana Workman Rep   03/14/24 14:39 EDT

## 2024-03-22 ENCOUNTER — OFFICE VISIT (OUTPATIENT)
Dept: ENDOCRINOLOGY | Age: 34
End: 2024-03-22
Payer: COMMERCIAL

## 2024-03-22 VITALS
OXYGEN SATURATION: 97 % | SYSTOLIC BLOOD PRESSURE: 112 MMHG | BODY MASS INDEX: 24.11 KG/M2 | WEIGHT: 131.8 LBS | DIASTOLIC BLOOD PRESSURE: 70 MMHG | TEMPERATURE: 96.8 F | HEART RATE: 120 BPM

## 2024-03-22 DIAGNOSIS — E10.65 TYPE 1 DIABETES MELLITUS WITH HYPERGLYCEMIA: Primary | ICD-10-CM

## 2024-03-22 PROCEDURE — 99214 OFFICE O/P EST MOD 30 MIN: CPT | Performed by: NURSE PRACTITIONER

## 2024-03-22 RX ORDER — INSULIN LISPRO 100 [IU]/ML
INJECTION, SOLUTION INTRAVENOUS; SUBCUTANEOUS
Qty: 50 ML | Refills: 0 | Status: SHIPPED | OUTPATIENT
Start: 2024-03-22

## 2024-03-22 NOTE — PROGRESS NOTES
"Chief Complaint  Diabetes (Medtronic attached, need refill on insulin)    Subjective        Kati Garza presents to [facility] ENDOCRINOLOGY  History of Present Illness    LOV 1/2023    Type 1 dm, 16 months old   DM regimen: Medtronic insulin pump 670 G  Not checking BS much   Last eye exam: 2023  Back up plan: insulin syringes   Denies diabetic foot concerns   Episodes of hypoglycemia - not often based on symptoms   Glucagon: Yes    Pump review  TDD 25u  Not bolusing for food   Not entering BS for bolus calculations     Objective   Vital Signs:  /70   Pulse 120   Temp 96.8 °F (36 °C) (Temporal)   Wt 59.8 kg (131 lb 12.8 oz)   SpO2 97%   BMI 24.11 kg/m²   Estimated body mass index is 24.11 kg/m² as calculated from the following:    Height as of 1/17/23: 157.5 cm (62\").    Weight as of this encounter: 59.8 kg (131 lb 12.8 oz).       BMI is within normal parameters. No other follow-up for BMI required.      Physical Exam  Vitals reviewed.   Constitutional:       General: She is not in acute distress.  HENT:      Head: Normocephalic and atraumatic.   Cardiovascular:      Rate and Rhythm: Normal rate.   Pulmonary:      Effort: Pulmonary effort is normal. No respiratory distress.   Musculoskeletal:         General: No signs of injury. Normal range of motion.      Cervical back: Normal range of motion and neck supple.   Skin:     General: Skin is warm and dry.   Neurological:      Mental Status: She is alert and oriented to person, place, and time. Mental status is at baseline.   Psychiatric:         Mood and Affect: Mood normal.         Behavior: Behavior normal.         Thought Content: Thought content normal.         Judgment: Judgment normal.        Result Review :    The following data was reviewed by: BENEDICT Jimenez on 03/22/2024:                 Assessment and Plan     Diagnoses and all orders for this visit:    1. Type 1 diabetes mellitus with hyperglycemia (Primary)  -     " Hemoglobin A1c  -     Comprehensive Metabolic Panel  -     Lipid Panel  -     Microalbumin / Creatinine Urine Ratio - Urine, Clean Catch    Other orders  -     Insulin Lispro (HumaLOG) 100 UNIT/ML injection; VIALS. 50u daily via pump e10.65  Dispense: 50 mL; Refill: 0             Follow Up     Return in about 3 months (around 6/22/2024).    Labs today  Consider upgrading to automated insulin delivery with cgm   Strongly recommend checking BS 4x/day  Further recommendations based on lab findings   Keep routine follow ups  Last A1c uncontrolled, high risk of complications r/t minimum BGM and unknown glycemic status, not taking insulin with food   Likely uncontrolled A1c      Patient was given instructions and counseling regarding her condition or for health maintenance advice. Please see specific information pulled into the AVS if appropriate.     Mikki Machuca APRN

## 2024-03-23 LAB
ALBUMIN SERPL-MCNC: 4.5 G/DL (ref 3.5–5.2)
ALBUMIN/CREAT UR: 52 MG/G CREAT (ref 0–29)
ALBUMIN/GLOB SERPL: 2 G/DL
ALP SERPL-CCNC: 74 U/L (ref 39–117)
ALT SERPL-CCNC: 12 U/L (ref 1–33)
AST SERPL-CCNC: 10 U/L (ref 1–32)
BILIRUB SERPL-MCNC: 0.2 MG/DL (ref 0–1.2)
BUN SERPL-MCNC: 11 MG/DL (ref 6–20)
BUN/CREAT SERPL: 15.1 (ref 7–25)
CALCIUM SERPL-MCNC: 9.5 MG/DL (ref 8.6–10.5)
CHLORIDE SERPL-SCNC: 103 MMOL/L (ref 98–107)
CHOLEST SERPL-MCNC: 182 MG/DL (ref 0–200)
CO2 SERPL-SCNC: 26.1 MMOL/L (ref 22–29)
CREAT SERPL-MCNC: 0.73 MG/DL (ref 0.57–1)
CREAT UR-MCNC: 219.9 MG/DL
EGFRCR SERPLBLD CKD-EPI 2021: 110.1 ML/MIN/1.73
GLOBULIN SER CALC-MCNC: 2.2 GM/DL
GLUCOSE SERPL-MCNC: 96 MG/DL (ref 65–99)
HBA1C MFR BLD: 12.3 % (ref 4.8–5.6)
HDLC SERPL-MCNC: 58 MG/DL (ref 40–60)
IMP & REVIEW OF LAB RESULTS: NORMAL
LDLC SERPL CALC-MCNC: 112 MG/DL (ref 0–100)
MICROALBUMIN UR-MCNC: 113.7 UG/ML
POTASSIUM SERPL-SCNC: 4 MMOL/L (ref 3.5–5.2)
PROT SERPL-MCNC: 6.7 G/DL (ref 6–8.5)
SODIUM SERPL-SCNC: 139 MMOL/L (ref 136–145)
TRIGL SERPL-MCNC: 64 MG/DL (ref 0–150)
VLDLC SERPL CALC-MCNC: 12 MG/DL (ref 5–40)

## 2024-03-25 ENCOUNTER — TELEPHONE (OUTPATIENT)
Dept: ENDOCRINOLOGY | Age: 34
End: 2024-03-25
Payer: COMMERCIAL

## 2024-03-25 NOTE — TELEPHONE ENCOUNTER
Left pt a message to return call.    OKAY FOR HUB AND  TO READ RESULTS.     A1c very poorly controlled, high risk of complications  Patient should check Bs at minimum 4x/day and take her bolus of insulin when BS > 180 and with all food intake  Strongly consider new pump that utilizes cgm  Kidney are also showing early signs of irritation from the elevated BS that have been going on long term

## 2024-06-18 NOTE — TELEPHONE ENCOUNTER
Rx Refill Note  Requested Prescriptions     Pending Prescriptions Disp Refills    Insulin Lispro (humaLOG) 100 UNIT/ML injection [Pharmacy Med Name: INSULIN LISPRO(BRAND)[id number] 100U/ML] 50 mL 3     Sig: INJECT 50 UNITS DAILY VIA PUMP      Last office visit with prescribing clinician: 3/22/2024   Last telemedicine visit with prescribing clinician: Visit date not found   Next office visit with prescribing clinician: 6/19/2024                         Would you like a call back once the refill request has been completed: [] Yes [] No    If the office needs to give you a call back, can they leave a voicemail: [] Yes [] No    Marci Kwok MA  06/18/24, 15:32 EDT

## 2024-06-19 ENCOUNTER — OFFICE VISIT (OUTPATIENT)
Dept: ENDOCRINOLOGY | Age: 34
End: 2024-06-19
Payer: COMMERCIAL

## 2024-06-19 VITALS
OXYGEN SATURATION: 100 % | WEIGHT: 146 LBS | DIASTOLIC BLOOD PRESSURE: 70 MMHG | HEIGHT: 62 IN | HEART RATE: 106 BPM | BODY MASS INDEX: 26.87 KG/M2 | SYSTOLIC BLOOD PRESSURE: 122 MMHG | TEMPERATURE: 98.9 F

## 2024-06-19 DIAGNOSIS — E10.65 TYPE 1 DIABETES MELLITUS WITH HYPERGLYCEMIA: Primary | ICD-10-CM

## 2024-06-19 PROCEDURE — 95251 CONT GLUC MNTR ANALYSIS I&R: CPT | Performed by: NURSE PRACTITIONER

## 2024-06-19 PROCEDURE — 99213 OFFICE O/P EST LOW 20 MIN: CPT | Performed by: NURSE PRACTITIONER

## 2024-06-19 RX ORDER — INSULIN LISPRO 100 [IU]/ML
INJECTION, SOLUTION INTRAVENOUS; SUBCUTANEOUS
Qty: 50 ML | Refills: 1 | Status: SHIPPED | OUTPATIENT
Start: 2024-06-19

## 2024-06-19 NOTE — PROGRESS NOTES
"Chief Complaint  Diabetes    Subjective        Kati Garza presents to [facility] ENDOCRINOLOGY  History of Present Illness    Type 1 dm, 16 months old   DM regimen: Medtronic insulin pump 780 G with smart guard technology   Back up plan: insulin syringes   Glucagon: baqsimi   Needs to schedule diabetic eye exam  Noticed about 10 pound weight gain since BS have been improving       Cgm review 6/6/24-6/19/24  1% low  56% time in range  37% high   6% very high   TDD 78u  62% bolus  38% basal    GMI 7.5%       Objective   Vital Signs:  /70 (BP Location: Left arm, Patient Position: Sitting)   Pulse 106   Temp 98.9 °F (37.2 °C) (Oral)   Ht 157.5 cm (62\")   Wt 66.2 kg (146 lb)   SpO2 100%   BMI 26.70 kg/m²   Estimated body mass index is 26.7 kg/m² as calculated from the following:    Height as of this encounter: 157.5 cm (62\").    Weight as of this encounter: 66.2 kg (146 lb).             Physical Exam  Vitals reviewed.   Constitutional:       General: She is not in acute distress.  HENT:      Head: Normocephalic and atraumatic.   Cardiovascular:      Rate and Rhythm: Normal rate.   Pulmonary:      Effort: Pulmonary effort is normal. No respiratory distress.   Musculoskeletal:         General: No signs of injury. Normal range of motion.      Cervical back: Normal range of motion and neck supple.   Skin:     General: Skin is warm and dry.   Neurological:      Mental Status: She is alert and oriented to person, place, and time. Mental status is at baseline.   Psychiatric:         Mood and Affect: Mood normal.         Behavior: Behavior normal.         Thought Content: Thought content normal.         Judgment: Judgment normal.        Result Review :    The following data was reviewed by: BENEDICT Jimenez on 06/19/2024:  Common labs          3/22/2024    09:43   Common Labs   Glucose 96    BUN 11    Creatinine 0.73    Sodium 139    Potassium 4.0    Chloride 103    Calcium 9.5    Total " Protein 6.7    Albumin 4.5    Total Bilirubin 0.2    Alkaline Phosphatase 74    AST (SGOT) 10    ALT (SGPT) 12    Total Cholesterol 182    Triglycerides 64    HDL Cholesterol 58    LDL Cholesterol  112    Hemoglobin A1C 12.30    Microalbumin, Urine 113.7                   Assessment and Plan     Diagnoses and all orders for this visit:    1. Type 1 diabetes mellitus with hyperglycemia (Primary)  -     Hemoglobin A1c  -     Basic Metabolic Panel  -     Lipid Panel  -     Microalbumin / Creatinine Urine Ratio - Urine, Clean Catch             Follow Up     Return in about 6 months (around 12/19/2024).    Cgm reviewed, very much improved  Labs today  No additional pump setting changes made today     Patient was given instructions and counseling regarding her condition or for health maintenance advice. Please see specific information pulled into the AVS if appropriate.     Mikki Machuca, APRN

## 2024-06-20 LAB
ALBUMIN/CREAT UR: <15 MG/G CREAT (ref 0–29)
BUN SERPL-MCNC: 14 MG/DL (ref 6–20)
BUN/CREAT SERPL: 18.9 (ref 7–25)
CALCIUM SERPL-MCNC: 10 MG/DL (ref 8.6–10.5)
CHLORIDE SERPL-SCNC: 102 MMOL/L (ref 98–107)
CHOLEST SERPL-MCNC: 159 MG/DL (ref 0–200)
CO2 SERPL-SCNC: 25.5 MMOL/L (ref 22–29)
CREAT SERPL-MCNC: 0.74 MG/DL (ref 0.57–1)
CREAT UR-MCNC: 19.9 MG/DL
EGFRCR SERPLBLD CKD-EPI 2021: 107.7 ML/MIN/1.73
GLUCOSE SERPL-MCNC: 90 MG/DL (ref 65–99)
HBA1C MFR BLD: 8.9 % (ref 4.8–5.6)
HDLC SERPL-MCNC: 55 MG/DL (ref 40–60)
IMP & REVIEW OF LAB RESULTS: NORMAL
LDLC SERPL CALC-MCNC: 92 MG/DL (ref 0–100)
MICROALBUMIN UR-MCNC: <3 UG/ML
POTASSIUM SERPL-SCNC: 4.3 MMOL/L (ref 3.5–5.2)
SODIUM SERPL-SCNC: 137 MMOL/L (ref 136–145)
TRIGL SERPL-MCNC: 62 MG/DL (ref 0–150)
VLDLC SERPL CALC-MCNC: 12 MG/DL (ref 5–40)

## 2024-12-09 ENCOUNTER — PRIOR AUTHORIZATION (OUTPATIENT)
Dept: ENDOCRINOLOGY | Age: 34
End: 2024-12-09

## 2024-12-09 ENCOUNTER — OFFICE VISIT (OUTPATIENT)
Dept: ENDOCRINOLOGY | Age: 34
End: 2024-12-09
Payer: COMMERCIAL

## 2024-12-09 VITALS
SYSTOLIC BLOOD PRESSURE: 122 MMHG | HEART RATE: 106 BPM | DIASTOLIC BLOOD PRESSURE: 80 MMHG | OXYGEN SATURATION: 100 % | HEIGHT: 62 IN | WEIGHT: 153.6 LBS | BODY MASS INDEX: 28.26 KG/M2

## 2024-12-09 DIAGNOSIS — E10.65 TYPE 1 DIABETES MELLITUS WITH HYPERGLYCEMIA: Primary | ICD-10-CM

## 2024-12-09 PROCEDURE — 95251 CONT GLUC MNTR ANALYSIS I&R: CPT | Performed by: NURSE PRACTITIONER

## 2024-12-09 PROCEDURE — 99213 OFFICE O/P EST LOW 20 MIN: CPT | Performed by: NURSE PRACTITIONER

## 2024-12-09 RX ORDER — INSULIN LISPRO 100 [IU]/ML
INJECTION, SOLUTION INTRAVENOUS; SUBCUTANEOUS
Qty: 50 ML | Refills: 1 | Status: SHIPPED | OUTPATIENT
Start: 2024-12-09

## 2024-12-09 RX ORDER — BLOOD SUGAR DIAGNOSTIC
1 STRIP MISCELLANEOUS 4 TIMES DAILY
COMMUNITY
End: 2024-12-09 | Stop reason: SDUPTHER

## 2024-12-09 RX ORDER — BLOOD SUGAR DIAGNOSTIC
1 STRIP MISCELLANEOUS 4 TIMES DAILY
Qty: 400 EACH | Refills: 5 | Status: SHIPPED | OUTPATIENT
Start: 2024-12-09

## 2024-12-09 NOTE — Clinical Note
Can we please submit PA for her accu check guide test strips so she can use the glucometer that goes with her pump

## 2024-12-09 NOTE — PROGRESS NOTES
"Chief Complaint  Diabetes (T1DM. Medtronic is attached.)    Subjective        Kati Garza presents to [facility] ENDOCRINOLOGY  History of Present Illness    Type 1 dm, 16 months old   DM regimen: Medtronic insulin pump 780 G with smart guard technology   Back up plan: insulin syringes   Glucagon: baqsimi   diabetic eye exam: planning to go the first of the year   Weight up about 20 pounds since earlier this year   Denies diabetic complications       Cgm review 11/25/24-12/8/24  0% low  63% time in range  31% high   7% very high   TDD 33u  43% bolus  GMI 7.3%       Objective   Vital Signs:  /80 (BP Location: Left arm, Patient Position: Sitting)   Pulse 106   Ht 157.5 cm (62.01\")   Wt 69.7 kg (153 lb 9.6 oz)   SpO2 100%   BMI 28.09 kg/m²   Estimated body mass index is 28.09 kg/m² as calculated from the following:    Height as of this encounter: 157.5 cm (62.01\").    Weight as of this encounter: 69.7 kg (153 lb 9.6 oz).          Physical Exam  Vitals reviewed.   Constitutional:       General: She is not in acute distress.  HENT:      Head: Normocephalic and atraumatic.   Cardiovascular:      Rate and Rhythm: Normal rate.   Pulmonary:      Effort: Pulmonary effort is normal. No respiratory distress.   Musculoskeletal:         General: No signs of injury. Normal range of motion.      Cervical back: Normal range of motion and neck supple.   Skin:     General: Skin is warm and dry.   Neurological:      Mental Status: She is alert and oriented to person, place, and time. Mental status is at baseline.   Psychiatric:         Mood and Affect: Mood normal.         Behavior: Behavior normal.         Thought Content: Thought content normal.         Judgment: Judgment normal.        Result Review :  The following data was reviewed by: BENEDICT Jimenez on 12/09/2024:  Common labs          3/22/2024    09:43 6/19/2024    15:37   Common Labs   Glucose 96  90    BUN 11  14    Creatinine 0.73  0.74  "   Sodium 139  137    Potassium 4.0  4.3    Chloride 103  102    Calcium 9.5  10.0    Total Protein 6.7     Albumin 4.5     Total Bilirubin 0.2     Alkaline Phosphatase 74     AST (SGOT) 10     ALT (SGPT) 12     Total Cholesterol 182  159    Triglycerides 64  62    HDL Cholesterol 58  55    LDL Cholesterol  112  92    Hemoglobin A1C 12.30  8.90    Microalbumin, Urine 113.7  <3.0                Assessment and Plan   Diagnoses and all orders for this visit:    1. Type 1 diabetes mellitus with hyperglycemia (Primary)  -     Insulin Lispro (humaLOG) 100 UNIT/ML injection; INJECT 50 UNITS DAILY VIA PUMP  Dispense: 50 mL; Refill: 1  -     glucose blood (Accu-Chek Guide Test) test strip; 1 each by Other route 4 (Four) Times a Day. E10.65  Dispense: 400 each; Refill: 5  -     Ambulatory Referral to Diabetic Education  -     Hemoglobin A1c  -     TSH  -     Comprehensive Metabolic Panel             Follow Up   Return in about 6 months (around 6/9/2025).    Cgm reviewed, not quite at goal but near target range  Labs today  Additional recommendations to follow as needed based on lab results   Wanting to consider working with a dietician to improve her dietary habits       Patient was given instructions and counseling regarding her condition or for health maintenance advice. Please see specific information pulled into the AVS if appropriate.       BENEDICT Jimenez

## 2024-12-10 LAB
ALBUMIN SERPL-MCNC: 4.5 G/DL (ref 3.9–4.9)
ALP SERPL-CCNC: 86 IU/L (ref 44–121)
ALT SERPL-CCNC: 11 IU/L (ref 0–32)
AST SERPL-CCNC: 16 IU/L (ref 0–40)
BILIRUB SERPL-MCNC: <0.2 MG/DL (ref 0–1.2)
BUN SERPL-MCNC: 10 MG/DL (ref 6–20)
BUN/CREAT SERPL: 13 (ref 9–23)
CALCIUM SERPL-MCNC: 9.8 MG/DL (ref 8.7–10.2)
CHLORIDE SERPL-SCNC: 102 MMOL/L (ref 96–106)
CO2 SERPL-SCNC: 23 MMOL/L (ref 20–29)
CREAT SERPL-MCNC: 0.8 MG/DL (ref 0.57–1)
EGFRCR SERPLBLD CKD-EPI 2021: 98 ML/MIN/1.73
GLOBULIN SER CALC-MCNC: 2.7 G/DL (ref 1.5–4.5)
GLUCOSE SERPL-MCNC: 149 MG/DL (ref 70–99)
HBA1C MFR BLD: 7.9 % (ref 4.8–5.6)
POTASSIUM SERPL-SCNC: 4.4 MMOL/L (ref 3.5–5.2)
PROT SERPL-MCNC: 7.2 G/DL (ref 6–8.5)
SODIUM SERPL-SCNC: 139 MMOL/L (ref 134–144)
TSH SERPL DL<=0.005 MIU/L-ACNC: 1.34 UIU/ML (ref 0.45–4.5)

## 2024-12-12 ENCOUNTER — TELEPHONE (OUTPATIENT)
Dept: ENDOCRINOLOGY | Age: 34
End: 2024-12-12
Payer: COMMERCIAL

## 2024-12-12 NOTE — TELEPHONE ENCOUNTER
Called and spoke with Angie from [employer] to review active pa on file and Test strips are approved 12/9/24-12/12/25 Case # [id number]

## 2024-12-12 NOTE — TELEPHONE ENCOUNTER
[facility] called. They are wanting to clarify the test strips. They want to know if a patient is using a cgm or insulin pump. If not, the preference strips is the one touch verio       Reference: [id number]

## 2024-12-12 NOTE — TELEPHONE ENCOUNTER
Called and spoke with Will from [employer].   Created a pa key [id number] , tried to submit pa     An active PA is already on file with expiration date of 12/12/2025. Please wait to resubmit request within 60 days of that expiration date to obtain a PA renewal.

## 2024-12-12 NOTE — TELEPHONE ENCOUNTER
Yes patient is using a pump which is why were are requesting a PA to cover the strips that match with her pump

## 2025-06-09 ENCOUNTER — OFFICE VISIT (OUTPATIENT)
Dept: ENDOCRINOLOGY | Age: 35
End: 2025-06-09
Payer: COMMERCIAL

## 2025-06-09 VITALS
HEIGHT: 62 IN | BODY MASS INDEX: 29.81 KG/M2 | TEMPERATURE: 98.3 F | DIASTOLIC BLOOD PRESSURE: 58 MMHG | HEART RATE: 89 BPM | OXYGEN SATURATION: 100 % | WEIGHT: 162 LBS | SYSTOLIC BLOOD PRESSURE: 120 MMHG

## 2025-06-09 DIAGNOSIS — E10.65 TYPE 1 DIABETES MELLITUS WITH HYPERGLYCEMIA: Primary | ICD-10-CM

## 2025-06-09 DIAGNOSIS — E10.3522: ICD-10-CM

## 2025-06-09 PROCEDURE — 95251 CONT GLUC MNTR ANALYSIS I&R: CPT | Performed by: NURSE PRACTITIONER

## 2025-06-09 PROCEDURE — 99214 OFFICE O/P EST MOD 30 MIN: CPT | Performed by: NURSE PRACTITIONER

## 2025-06-09 NOTE — PROGRESS NOTES
"Chief Complaint  Diabetes    Subjective        Kati Garza presents to [facility] ENDOCRINOLOGY  History of Present Illness    Type 1 dm, 16 months old   DM regimen: Medtronic insulin pump 780 G with smart guard technology and humalog vials  Back up plan: insulin syringes   Glucagon: baqsimi   diabetic eye exam: now following with de, s/p PPV TRD OS 2/14/25  Lives and active lifestyle  Does her best to be consistent with insulin dose before meals but does seem to be the area to focus in on more  Reports her mental health has been doing well  She had no questions, concerns or complaints today       Cgm review 5/26/25-6/8/25  0% low  58% time in range  35% high   7% very high   TDD 43u  52% bolus ( 87% autocorrection)  GMI 7.5%     Objective   Vital Signs:  /58   Pulse 89   Temp 98.3 °F (36.8 °C) (Oral)   Ht 157.5 cm (62.01\")   Wt 73.5 kg (162 lb)   SpO2 100%   BMI 29.62 kg/m²   Estimated body mass index is 29.62 kg/m² as calculated from the following:    Height as of this encounter: 157.5 cm (62.01\").    Weight as of this encounter: 73.5 kg (162 lb).          Physical Exam  Vitals reviewed.   Constitutional:       General: She is not in acute distress.  HENT:      Head: Normocephalic and atraumatic.   Cardiovascular:      Rate and Rhythm: Normal rate.   Pulmonary:      Effort: Pulmonary effort is normal. No respiratory distress.   Musculoskeletal:         General: No signs of injury. Normal range of motion.      Cervical back: Normal range of motion and neck supple.   Skin:     General: Skin is warm and dry.   Neurological:      Mental Status: She is alert and oriented to person, place, and time. Mental status is at baseline.   Psychiatric:         Mood and Affect: Mood normal.         Behavior: Behavior normal.         Thought Content: Thought content normal.         Judgment: Judgment normal.          Result Review :  The following data was reviewed by: Mikki" BENEDICT Machuca on 06/09/2025:  Common labs          6/19/2024    15:37 12/9/2024    15:40   Common Labs   Glucose 90  149    BUN 14  10    Creatinine 0.74  0.80    Sodium 137  139    Potassium 4.3  4.4    Chloride 102  102    Calcium 10.0  9.8    Albumin  4.5    Total Bilirubin  <0.2    Alkaline Phosphatase  86    AST (SGOT)  16    ALT (SGPT)  11    Total Cholesterol 159     Triglycerides 62     HDL Cholesterol 55     LDL Cholesterol  92     Hemoglobin A1C 8.90  7.9    Microalbumin, Urine <3.0                 Assessment and Plan   Diagnoses and all orders for this visit:    1. Type 1 diabetes mellitus with hyperglycemia (Primary)  -     Hemoglobin A1c  -     Basic Metabolic Panel    2. 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    Cgm reviewed, stable, not quite at goal  Labs today   Additional recommendations to follow as needed  She is really liking the new medtronic system without concerns or complaints   Continue to focus on insulin dose/ timing before meal start     Patient was given instructions and counseling regarding her condition or for health maintenance advice. Please see specific information pulled into the AVS if appropriate.       BENEDICT Jimenez

## 2025-06-10 LAB
BUN SERPL-MCNC: 7 MG/DL (ref 6–20)
BUN/CREAT SERPL: 9.7 (ref 7–25)
CALCIUM SERPL-MCNC: 9.1 MG/DL (ref 8.6–10.5)
CHLORIDE SERPL-SCNC: 105 MMOL/L (ref 98–107)
CO2 SERPL-SCNC: 24 MMOL/L (ref 22–29)
CREAT SERPL-MCNC: 0.72 MG/DL (ref 0.57–1)
EGFRCR SERPLBLD CKD-EPI 2021: 110.6 ML/MIN/1.73
GLUCOSE SERPL-MCNC: 91 MG/DL (ref 65–99)
HBA1C MFR BLD: 7.9 % (ref 4.8–5.6)
POTASSIUM SERPL-SCNC: 4.6 MMOL/L (ref 3.5–5.2)
SODIUM SERPL-SCNC: 139 MMOL/L (ref 136–145)